# Patient Record
Sex: MALE | Race: WHITE | NOT HISPANIC OR LATINO | Employment: FULL TIME | URBAN - METROPOLITAN AREA
[De-identification: names, ages, dates, MRNs, and addresses within clinical notes are randomized per-mention and may not be internally consistent; named-entity substitution may affect disease eponyms.]

---

## 2022-04-27 ENCOUNTER — TELEPHONE (OUTPATIENT)
Dept: GASTROENTEROLOGY | Facility: CLINIC | Age: 27
End: 2022-04-27

## 2022-06-01 ENCOUNTER — OFFICE VISIT (OUTPATIENT)
Dept: GASTROENTEROLOGY | Facility: CLINIC | Age: 27
End: 2022-06-01
Payer: COMMERCIAL

## 2022-06-01 VITALS
HEIGHT: 72 IN | DIASTOLIC BLOOD PRESSURE: 64 MMHG | SYSTOLIC BLOOD PRESSURE: 118 MMHG | OXYGEN SATURATION: 99 % | WEIGHT: 179 LBS | HEART RATE: 86 BPM | BODY MASS INDEX: 24.24 KG/M2

## 2022-06-01 DIAGNOSIS — K21.9 GASTROESOPHAGEAL REFLUX DISEASE WITHOUT ESOPHAGITIS: Primary | ICD-10-CM

## 2022-06-01 PROCEDURE — 99244 OFF/OP CNSLTJ NEW/EST MOD 40: CPT | Performed by: INTERNAL MEDICINE

## 2022-06-01 RX ORDER — ALPRAZOLAM 0.5 MG/1
0.5 TABLET ORAL 2 TIMES DAILY PRN
COMMUNITY
Start: 2022-05-23

## 2022-06-01 RX ORDER — PANTOPRAZOLE SODIUM 40 MG/1
40 TABLET, DELAYED RELEASE ORAL
Qty: 62 TABLET | Refills: 6 | Status: SHIPPED | OUTPATIENT
Start: 2022-06-01 | End: 2022-06-24

## 2022-06-01 RX ORDER — PANTOPRAZOLE SODIUM 40 MG/1
40 TABLET, DELAYED RELEASE ORAL DAILY
COMMUNITY
Start: 2022-05-20

## 2022-06-01 NOTE — PATIENT INSTRUCTIONS
Scheduled date of EGD(as of today):6/13/22  Physician performing EGD:Claribel  Location of EGD:[place]  Instructions reviewed with patient by:Kristie ROWLAND  Clearances:  none

## 2022-06-01 NOTE — PROGRESS NOTES
[facility] - Outpatient Consultation  Denzel Adamson 27 y o  male MRN: [id number]  Encounter: [id number]          ASSESSMENT AND PLAN:      1  Gastroesophageal reflux disease without esophagitis  - pantoprazole (PROTONIX) 40 mg tablet; Take 1 tablet (40 mg total) by mouth 2 (two) times a day before meals  Dispense: 62 tablet; Refill: 6  - EGD; Future    We had a discussion regarding Barretts esophagus and a diagram was used to demonstrate how Bledsoe's is formed  Since he remains symptomatic despite pantoprazole once a day my plan was to increase the pantoprazole to twice daily for 1 month and also to plan to perform esophagogastroduodenoscopy  The patient is in full agreement     ______________________________________________________________________    HPI:  This 68-year-old male comes the office today with complaint of ongoing problems with gastroesophageal reflux disease  He states that his symptoms became severe about 1 year ago with daily multiple episodes of heartburn  He has been on pantoprazole for the past 1 month which she takes in the morning time before breakfast   He states that this has reduced his acid reflux symptoms significantly however he still experiencing a single breakthrough heartburn symptom daily  He denies eating late at night before going to bed  We discussed the some of the food triggers to include sodas which she has reduce  He also states that red tomato sauces frequent provoke her and he has tried to reduce that as well  He does admit to gaseousness but he denies diarrhea, constipation, abdominal pain, rectal bleeding, hematemesis, melena, nausea, vomiting  He states that his weight was a factor  He used to weigh 160 lb and then this increased to 200 lb and now that he is able to go to the gym and eat better, his weight has reduced back down to 179 lb  He denies dysphagia or odynophagia        REVIEW OF SYSTEMS:    CONSTITUTIONAL: Denies any fever, chills, rigors, and weight loss  HEENT: No earache or tinnitus  Denies hearing loss or visual disturbances  CARDIOVASCULAR: No chest pain or palpitations  RESPIRATORY: Denies any cough, hemoptysis, shortness of breath or dyspnea on exertion  GASTROINTESTINAL: As noted in the History of Present Illness  GENITOURINARY: No problems with urination  Denies any hematuria or dysuria  NEUROLOGIC: No dizziness or vertigo, denies headaches  MUSCULOSKELETAL: Denies any muscle or joint pain  SKIN: Denies skin rashes or itching  ENDOCRINE: Denies excessive thirst  Denies intolerance to heat or cold  PSYCHOSOCIAL: Denies depression or anxiety  Denies any recent memory loss  Historical Information   History reviewed  No pertinent past medical history  Past Surgical History:   Procedure Laterality Date    GUM SURGERY       Social History   Social History     Substance and Sexual Activity   Alcohol Use Yes    Comment: soc     Social History     Substance and Sexual Activity   Drug Use Never     Social History     Tobacco Use   Smoking Status Never Smoker   Smokeless Tobacco Never Used     History reviewed  No pertinent family history  Meds/Allergies       Current Outpatient Medications:     pantoprazole (PROTONIX) 40 mg tablet    ALPRAZolam (XANAX) 0 5 mg tablet    pantoprazole (PROTONIX) 40 mg tablet    Allergies   Allergen Reactions    Amoxicillin Other (See Comments)    Iodine - Food Allergy Other (See Comments)           Objective     Blood pressure 118/64, pulse 86, height 6' (1 829 m), weight 81 2 kg (179 lb), SpO2 99 %  Body mass index is 24 28 kg/m²  PHYSICAL EXAM:      General Appearance:   Alert, cooperative, no distress   HEENT:   Normocephalic, atraumatic, anicteric      Neck:  Supple, symmetrical, trachea midline   Lungs:   Clear to auscultation bilaterally; no rales, rhonchi or wheezing; respirations unlabored    Heart[de-identified]   Regular rate and rhythm; no murmur, rub, or gallop  Abdomen:   Soft, non-tender, non-distended; normal bowel sounds; no masses, no organomegaly    Genitalia:   Deferred    Rectal:   Deferred    Extremities:  No cyanosis, clubbing or edema    Pulses:  2+ and symmetric    Skin:  No jaundice, rashes, or lesions    Lymph nodes:  No palpable cervical lymphadenopathy        Lab Results:   No visits with results within 1 Day(s) from this visit  Latest known visit with results is:   No results found for any previous visit  Radiology Results:   No results found

## 2022-06-01 NOTE — LETTER
June 2, 2022     MD [place] [id number]    Patient: Rena Wolfe   YOB: 1995   Date of Visit: 6/1/2022       Dear Dr Kelly Gomez: Thank you for referring Rena Wolfe to me for evaluation  Below are my notes for this consultation  If you have questions, please do not hesitate to call me  I look forward to following your patient along with you  Sincerely,        Perry Carvajal DO        CC: No Recipients  Perry Carvajal DO  6/1/2022  8:22 AM  Signed  [facility] - Outpatient Consultation  Denzel Adamson 27 y o  male MRN: [id number]  Encounter: [id number]          ASSESSMENT AND PLAN:      1  Gastroesophageal reflux disease without esophagitis  - pantoprazole (PROTONIX) 40 mg tablet; Take 1 tablet (40 mg total) by mouth 2 (two) times a day before meals  Dispense: 62 tablet; Refill: 6  - EGD; Future    We had a discussion regarding Barretts esophagus and a diagram was used to demonstrate how Bledsoe's is formed  Since he remains symptomatic despite pantoprazole once a day my plan was to increase the pantoprazole to twice daily for 1 month and also to plan to perform esophagogastroduodenoscopy  The patient is in full agreement     ______________________________________________________________________    HPI:  This 68-year-old male comes the office today with complaint of ongoing problems with gastroesophageal reflux disease  He states that his symptoms became severe about 1 year ago with daily multiple episodes of heartburn  He has been on pantoprazole for the past 1 month which she takes in the morning time before breakfast   He states that this has reduced his acid reflux symptoms significantly however he still experiencing a single breakthrough heartburn symptom daily  He denies eating late at night before going to bed  We discussed the some of the food triggers to include sodas which she has reduce    He also states that red tomato sauces frequent provoke her and he has tried to reduce that as well  He does admit to gaseousness but he denies diarrhea, constipation, abdominal pain, rectal bleeding, hematemesis, melena, nausea, vomiting  He states that his weight was a factor  He used to weigh 160 lb and then this increased to 200 lb and now that he is able to go to the gym and eat better, his weight has reduced back down to 179 lb  He denies dysphagia or odynophagia  REVIEW OF SYSTEMS:    CONSTITUTIONAL: Denies any fever, chills, rigors, and weight loss  HEENT: No earache or tinnitus  Denies hearing loss or visual disturbances  CARDIOVASCULAR: No chest pain or palpitations  RESPIRATORY: Denies any cough, hemoptysis, shortness of breath or dyspnea on exertion  GASTROINTESTINAL: As noted in the History of Present Illness  GENITOURINARY: No problems with urination  Denies any hematuria or dysuria  NEUROLOGIC: No dizziness or vertigo, denies headaches  MUSCULOSKELETAL: Denies any muscle or joint pain  SKIN: Denies skin rashes or itching  ENDOCRINE: Denies excessive thirst  Denies intolerance to heat or cold  PSYCHOSOCIAL: Denies depression or anxiety  Denies any recent memory loss  Historical Information   History reviewed  No pertinent past medical history  Past Surgical History:   Procedure Laterality Date    GUM SURGERY       Social History   Social History     Substance and Sexual Activity   Alcohol Use Yes    Comment: soc     Social History     Substance and Sexual Activity   Drug Use Never     Social History     Tobacco Use   Smoking Status Never Smoker   Smokeless Tobacco Never Used     History reviewed  No pertinent family history      Meds/Allergies       Current Outpatient Medications:     pantoprazole (PROTONIX) 40 mg tablet    ALPRAZolam (XANAX) 0 5 mg tablet    pantoprazole (PROTONIX) 40 mg tablet    Allergies   Allergen Reactions    Amoxicillin Other (See Comments)    Iodine - Food Allergy Other (See Comments)           Objective     Blood pressure 118/64, pulse 86, height 6' (1 829 m), weight 81 2 kg (179 lb), SpO2 99 %  Body mass index is 24 28 kg/m²  PHYSICAL EXAM:      General Appearance:   Alert, cooperative, no distress   HEENT:   Normocephalic, atraumatic, anicteric      Neck:  Supple, symmetrical, trachea midline   Lungs:   Clear to auscultation bilaterally; no rales, rhonchi or wheezing; respirations unlabored    Heart[de-identified]   Regular rate and rhythm; no murmur, rub, or gallop  Abdomen:   Soft, non-tender, non-distended; normal bowel sounds; no masses, no organomegaly    Genitalia:   Deferred    Rectal:   Deferred    Extremities:  No cyanosis, clubbing or edema    Pulses:  2+ and symmetric    Skin:  No jaundice, rashes, or lesions    Lymph nodes:  No palpable cervical lymphadenopathy        Lab Results:   No visits with results within 1 Day(s) from this visit  Latest known visit with results is:   No results found for any previous visit  Radiology Results:   No results found

## 2022-06-09 ENCOUNTER — TELEPHONE (OUTPATIENT)
Dept: PULMONOLOGY | Facility: CLINIC | Age: 27
End: 2022-06-09

## 2022-06-09 NOTE — TELEPHONE ENCOUNTER
LM for Pt to call back to schedule consult  Reason for visit is due to reoccurring respiratory infections  Can be scheduled first available 
Negative

## 2022-06-13 ENCOUNTER — ANESTHESIA (OUTPATIENT)
Dept: GASTROENTEROLOGY | Facility: HOSPITAL | Age: 27
End: 2022-06-13

## 2022-06-13 ENCOUNTER — HOSPITAL ENCOUNTER (OUTPATIENT)
Dept: GASTROENTEROLOGY | Facility: HOSPITAL | Age: 27
Setting detail: OUTPATIENT SURGERY
Discharge: HOME | End: 2022-06-13
Attending: INTERNAL MEDICINE | Admitting: INTERNAL MEDICINE
Payer: COMMERCIAL

## 2022-06-13 ENCOUNTER — ANESTHESIA EVENT (OUTPATIENT)
Dept: GASTROENTEROLOGY | Facility: HOSPITAL | Age: 27
End: 2022-06-13

## 2022-06-13 VITALS
RESPIRATION RATE: 16 BRPM | HEIGHT: 72 IN | OXYGEN SATURATION: 100 % | DIASTOLIC BLOOD PRESSURE: 56 MMHG | BODY MASS INDEX: 24.16 KG/M2 | SYSTOLIC BLOOD PRESSURE: 114 MMHG | WEIGHT: 178.35 LBS | TEMPERATURE: 97.8 F | HEART RATE: 54 BPM

## 2022-06-13 DIAGNOSIS — K21.9 GASTROESOPHAGEAL REFLUX DISEASE WITHOUT ESOPHAGITIS: ICD-10-CM

## 2022-06-13 PROCEDURE — 43235 EGD DIAGNOSTIC BRUSH WASH: CPT | Performed by: INTERNAL MEDICINE

## 2022-06-13 RX ORDER — MELOXICAM 15 MG/1
15 TABLET ORAL DAILY
COMMUNITY

## 2022-06-13 RX ORDER — PROPOFOL 10 MG/ML
INJECTION, EMULSION INTRAVENOUS AS NEEDED
Status: DISCONTINUED | OUTPATIENT
Start: 2022-06-13 | End: 2022-06-13

## 2022-06-13 RX ORDER — SODIUM CHLORIDE, SODIUM LACTATE, POTASSIUM CHLORIDE, CALCIUM CHLORIDE 600; 310; 30; 20 MG/100ML; MG/100ML; MG/100ML; MG/100ML
125 INJECTION, SOLUTION INTRAVENOUS CONTINUOUS
Status: CANCELLED | OUTPATIENT
Start: 2022-06-13

## 2022-06-13 RX ORDER — SODIUM CHLORIDE, SODIUM LACTATE, POTASSIUM CHLORIDE, CALCIUM CHLORIDE 600; 310; 30; 20 MG/100ML; MG/100ML; MG/100ML; MG/100ML
125 INJECTION, SOLUTION INTRAVENOUS CONTINUOUS
Status: DISCONTINUED | OUTPATIENT
Start: 2022-06-13 | End: 2022-06-17 | Stop reason: HOSPADM

## 2022-06-13 RX ORDER — LIDOCAINE HYDROCHLORIDE 20 MG/ML
INJECTION, SOLUTION EPIDURAL; INFILTRATION; INTRACAUDAL; PERINEURAL AS NEEDED
Status: DISCONTINUED | OUTPATIENT
Start: 2022-06-13 | End: 2022-06-13

## 2022-06-13 RX ADMIN — PROPOFOL 50 MG: 10 INJECTION, EMULSION INTRAVENOUS at 10:18

## 2022-06-13 RX ADMIN — LIDOCAINE HYDROCHLORIDE 80 MG: 20 INJECTION, SOLUTION EPIDURAL; INFILTRATION; INTRACAUDAL at 10:17

## 2022-06-13 RX ADMIN — PROPOFOL 150 MG: 10 INJECTION, EMULSION INTRAVENOUS at 10:17

## 2022-06-13 RX ADMIN — SODIUM CHLORIDE, SODIUM LACTATE, POTASSIUM CHLORIDE, AND CALCIUM CHLORIDE 125 ML/HR: .6; .31; .03; .02 INJECTION, SOLUTION INTRAVENOUS at 09:18

## 2022-06-13 NOTE — H&P
History and Physical - [facility]  Denzel Adamson 27 y o  male MRN: [id number]      HPI: Homar Bennett is a 27y o  year old male who presents for evaluation of gastroesophageal reflux disease      REVIEW OF SYSTEMS: Per the HPI, and otherwise unremarkable  Historical Information   History reviewed  No pertinent past medical history  Past Surgical History:   Procedure Laterality Date    EGD      GUM SURGERY       Social History   Social History     Substance and Sexual Activity   Alcohol Use Yes    Comment: soc     Social History     Substance and Sexual Activity   Drug Use Never     Social History     Tobacco Use   Smoking Status Never Smoker   Smokeless Tobacco Never Used     History reviewed  No pertinent family history  Meds/Allergies     (Not in a hospital admission)      Allergies   Allergen Reactions    Amoxicillin Other (See Comments)    Iodine - Food Allergy Other (See Comments)       Objective     Blood pressure 120/68, pulse 61, temperature 98 1 °F (36 7 °C), temperature source Temporal, resp  rate 16, height 6' (1 829 m), weight 80 9 kg (178 lb 5 6 oz), SpO2 100 %  PHYSICAL EXAM    Gen: NAD  CV: RRR  CHEST: Clear  ABD: soft, NT/ND  EXT: no edema      ASSESSMENT/PLAN:  This is a 27y o  year old male here for EGD, and he is stable and optimized for his procedure

## 2022-06-13 NOTE — ANESTHESIA POSTPROCEDURE EVALUATION
Post-Op Assessment Note    CV Status:  Stable    Pain management: adequate     Mental Status:  Alert and awake   Hydration Status:  Euvolemic   PONV Controlled:  Controlled   Airway Patency:  Patent      Post Op Vitals Reviewed: Yes      Staff: Anesthesiologist, CRNA         No complications documented      /63 (06/13/22 1025)    Temp 97 8 °F (36 6 °C) (06/13/22 1025)    Pulse 60 (06/13/22 1025)   Resp 16 (06/13/22 1025)    SpO2 98 % (06/13/22 1025)

## 2022-06-13 NOTE — ANESTHESIA PREPROCEDURE EVALUATION
Procedure:  EGD         Physical Exam    Airway    Mallampati score: III  TM Distance: >3 FB  Neck ROM: full     Dental       Cardiovascular  Cardiovascular exam normal    Pulmonary  Pulmonary exam normal     Other Findings      ASSESSMENT AND PLAN:       1  Gastroesophageal reflux disease without esophagitis  - pantoprazole (PROTONIX) 40 mg tablet; Take 1 tablet (40 mg total) by mouth 2 (two) times a day before meals  Dispense: 62 tablet; Refill: 6  - EGD; Future     We had a discussion regarding Barretts esophagus and a diagram was used to demonstrate how Bledsoe's is formed  Since he remains symptomatic despite pantoprazole once a day my plan was to increase the pantoprazole to twice daily for 1 month and also to plan to perform esophagogastroduodenoscopy  The patient is in full agreement      ______________________________________________________________________     HPI:  This 57-year-old male comes the office today with complaint of ongoing problems with gastroesophageal reflux disease  He states that his symptoms became severe about 1 year ago with daily multiple episodes of heartburn  He has been on pantoprazole for the past 1 month which she takes in the morning time before breakfast   He states that this has reduced his acid reflux symptoms significantly however he still experiencing a single breakthrough heartburn symptom daily  He denies eating late at night before going to bed  We discussed the some of the food triggers to include sodas which she has reduce  He also states that red tomato sauces frequent provoke her and he has tried to reduce that as well  He does admit to gaseousness but he denies diarrhea, constipation, abdominal pain, rectal bleeding, hematemesis, melena, nausea, vomiting  He states that his weight was a factor  He used to weigh 160 lb and then this increased to 200 lb and now that he is able to go to the gym and eat better, his weight has reduced back down to 179 lb    He denies dysphagia or odynophagia      Anesthesia Plan  ASA Score- 2     Anesthesia Type- IV sedation with anesthesia with ASA Monitors  Additional Monitors:   Airway Plan:           Plan Factors-Exercise tolerance (METS): >4 METS  Chart reviewed  EKG reviewed  Existing labs reviewed  Patient summary reviewed  Patient is not a current smoker  Induction- intravenous  Postoperative Plan-     Informed Consent- Anesthetic plan and risks discussed with patient  I personally reviewed this patient with the CRNA  Discussed and agreed on the Anesthesia Plan with the CRNA  Craig Lopez

## 2022-06-14 ENCOUNTER — TELEPHONE (OUTPATIENT)
Dept: GASTROENTEROLOGY | Facility: CLINIC | Age: 27
End: 2022-06-14

## 2022-06-14 ENCOUNTER — PREP FOR PROCEDURE (OUTPATIENT)
Dept: GASTROENTEROLOGY | Facility: CLINIC | Age: 27
End: 2022-06-14

## 2022-06-14 DIAGNOSIS — K21.9 GASTROESOPHAGEAL REFLUX DISEASE WITHOUT ESOPHAGITIS: Primary | ICD-10-CM

## 2022-06-14 NOTE — TELEPHONE ENCOUNTER
----- Message from Yeison Ponce DO sent at 6/13/2022 10:28 AM EDT -----  Regarding: schedule egd with bravo placement  Please schedule this patient for an EGD with Mazariegos placement a make shift the patient is not on a proton pump inhibitor for at least 48 hours prior to the procedure

## 2022-06-14 NOTE — TELEPHONE ENCOUNTER
Spoke with the patient and he scheduled his procedure  Prep instructions mailed to his home indicating to stop his Pantoprazole 48 hours prior to the procedure      Procedure: EGD with Bravo placement  Scheduled date of procedure (as of today):8/15/22  Physician performing procedure:Claribel  Location of procedure:[place]  Instructions reviewed with patient by: Lily Noland  Clearances: none

## 2022-06-24 DIAGNOSIS — K21.9 GASTROESOPHAGEAL REFLUX DISEASE WITHOUT ESOPHAGITIS: ICD-10-CM

## 2022-06-24 RX ORDER — PANTOPRAZOLE SODIUM 40 MG/1
TABLET, DELAYED RELEASE ORAL
Qty: 186 TABLET | Refills: 3 | Status: SHIPPED | OUTPATIENT
Start: 2022-06-24

## 2022-06-27 ENCOUNTER — TELEPHONE (OUTPATIENT)
Dept: GASTROENTEROLOGY | Facility: CLINIC | Age: 27
End: 2022-06-27

## 2022-06-27 NOTE — TELEPHONE ENCOUNTER
Patients GI provider:  Dr Malathi Mejia    Number to return call: [phone]    Reason for call: Pt called in stating that since he has been taking pantoprazole that he is having BMs more frequently, diarrhea and bright red blood when wiping  Pt would like to know if he should have his procedure pushed up or stop taking the pantoprazole  Above is his number       Scheduled procedure/appointment date if applicable: procedure 0/48/40

## 2022-06-27 NOTE — TELEPHONE ENCOUNTER
ALEXY: Spoke with patient  History of GERD    Patient c/o soft frequent BM after starting pantoprazole 40mg BID  He also has BRBPR when wiping since BM have been more frequent  Patient would like to decrease his PPI to once a day and follow-up with EGD/HESS   HE will let us know if symptoms persist

## 2022-07-07 ENCOUNTER — CONSULT (OUTPATIENT)
Dept: PULMONOLOGY | Facility: CLINIC | Age: 27
End: 2022-07-07
Payer: COMMERCIAL

## 2022-07-07 VITALS
RESPIRATION RATE: 18 BRPM | HEART RATE: 66 BPM | SYSTOLIC BLOOD PRESSURE: 120 MMHG | BODY MASS INDEX: 23.57 KG/M2 | OXYGEN SATURATION: 99 % | DIASTOLIC BLOOD PRESSURE: 82 MMHG | TEMPERATURE: 97.6 F | WEIGHT: 174 LBS | HEIGHT: 72 IN

## 2022-07-07 DIAGNOSIS — R06.02 SOB (SHORTNESS OF BREATH): Primary | ICD-10-CM

## 2022-07-07 DIAGNOSIS — R05.3 CHRONIC COUGH: ICD-10-CM

## 2022-07-07 DIAGNOSIS — R09.82 POSTNASAL DRIP: ICD-10-CM

## 2022-07-07 DIAGNOSIS — K21.9 GASTROESOPHAGEAL REFLUX DISEASE WITHOUT ESOPHAGITIS: ICD-10-CM

## 2022-07-07 DIAGNOSIS — R07.89 OTHER CHEST PAIN: ICD-10-CM

## 2022-07-07 DIAGNOSIS — R00.2 PALPITATION: ICD-10-CM

## 2022-07-07 PROCEDURE — 99244 OFF/OP CNSLTJ NEW/EST MOD 40: CPT | Performed by: INTERNAL MEDICINE

## 2022-07-07 NOTE — ASSESSMENT & PLAN NOTE
Given multiple complaints involving multiple system an otherwise very healthy young man I believe these could be manifestation of anxiety/panic disorder and explained that to the patient and his mother, he once reassurance that there is no organic problem going on and he is already following with psychotherapist who told him that he does not have anxiety disorder  I will send patient for PFTs with methacholine challenge test although even if he has asthma which may explain the shortness of breath and the chronic cough but does not explain the other symptoms  If he continues to have breathing issues in the future any to cardiopulmonary exercise test and chest CT scan

## 2022-07-07 NOTE — ASSESSMENT & PLAN NOTE
Unclear etiology, most likely not cardiac ischemia and not related to pulmonary disease  One thing to consider is mitral valve prolapse and to consider echocardiogram in the future    Otherwise it could be related to anxiety as described above  I will check some labs to rule out inflammatory status such as CRP, sed rate and also PIEDAD

## 2022-07-07 NOTE — ASSESSMENT & PLAN NOTE
Most likely secondary to chronic sinus congestion and postnasal drip with possibility of GERD or cough variant asthma    Will follow on PFTs with methacholine challenge test

## 2022-07-07 NOTE — PROGRESS NOTES
Consultation - Pulmonary Medicine   Denzel Adamson 27 y o  male MRN: [id number]    Physician Requesting Consult: Rachel Thomas MD  Reason for Consult:  Shortness of breath and chest pain    SOB (shortness of breath)  Given multiple complaints involving multiple system an otherwise very healthy young man I believe these could be manifestation of anxiety/panic disorder and explained that to the patient and his mother, he once reassurance that there is no organic problem going on and he is already following with psychotherapist who told him that he does not have anxiety disorder  I will send patient for PFTs with methacholine challenge test although even if he has asthma which may explain the shortness of breath and the chronic cough but does not explain the other symptoms  If he continues to have breathing issues in the future any to cardiopulmonary exercise test and chest CT scan  Other chest pain  Unclear etiology, most likely not cardiac ischemia and not related to pulmonary disease  One thing to consider is mitral valve prolapse and to consider echocardiogram in the future  Otherwise it could be related to anxiety as described above  I will check some labs to rule out inflammatory status such as CRP, sed rate and also PIEDAD    Palpitation  Also could be related to anxiety but will check TSH and other labs    Chronic cough  Most likely secondary to chronic sinus congestion and postnasal drip with possibility of GERD or cough variant asthma  Will follow on PFTs with methacholine challenge test      Postnasal drip  I offered patient Flonase but he could not tolerate in the past then I offered ipratropium or azelastine but he prefers not to treat for now  GERD (gastroesophageal reflux disease)  He stop Protonix due to diarrhea  I offered him Pepcid but he wants to discuss with his PCP  I explained to him that it is over-the-counter as well        Diagnoses and all orders for this visit:    SOB (shortness of breath)  -     Complete PFT with post bronchodilator; Future  -     Methacholine challenge; Future  -     TSH, 3rd generation with Free T4 reflex; Future  -     C-reactive protein; Future  -     Sedimentation rate, automated; Future  -     PIEDAD Screen w/ Reflex to Titer/Pattern; Future    Postnasal drip    Palpitation  -     TSH, 3rd generation with Free T4 reflex; Future  -     C-reactive protein; Future    Other chest pain  -     C-reactive protein; Future  -     Sedimentation rate, automated; Future  -     PIEDAD Screen w/ Reflex to Titer/Pattern; Future    Chronic cough  -     Complete PFT with post bronchodilator; Future  -     Methacholine challenge; Future    Gastroesophageal reflux disease without esophagitis      ______________________________________________________________________    HPI:    Marilyn Zhang is a 27 y o  male who presents for evaluation of chest pain and shortness of breath  Patient has multiple vague complaints that started about 2 months ago  Initially started with heartburn/acid reflux then followed by bilateral chest pain and episodes of shortness of breath and hyperventilation  He went to the emergency room twice and had workup including chest x-ray and EKGs and D-dimer and everything was negative, at some point he was told that he has costochondritis and treated with NSAID without improvement  Currently he follows with chiropractor  He continues to have episodes of chest pain sometimes at rest or without any exertion, in the same for the shortness of breath that can happen at rest   He reports that he wakes up in the morning feeling fine then during the day he feels tired and gets symptoms of pain and shortness of breath  He was treated with pantoprazole and the dose was increased to twice daily that caused him diarrhea so he stopped  Currently after stopping the pantoprazole started to have the GERD again but he is managing without it    He had EGD recently that was normal   He had also other symptoms including nausea and vomiting recently, also some lightheadedness and dizziness sensation that resolved, he had also tingling/numbness in his arms/hands  He also reports some palpitations but that improved  Patient follows with psychotherapist who prescribed him Xanax to use p r n  when he gets anxious due to that chest pain  According to the patient his therapist told him that he does not have panic disorder  Patient has chronic sinus congestion with postnasal drip and he has chronic dry cough that goes for years  He tried Flonase in the past that caused him nasal bleed and so sometimes he takes Allegra  He denies legs edema, denies skin rashes, denies history of asthma  When he was a child he has some skin rash that was diffuse and probably atopic dermatitis that improved and at that time he was given inhaler but did not use because he did not have respiratory symptoms as per his mom  He denies arthralgias  He used to go to the gym routinely but currently start because of the above symptoms  He reports weight loss about 20-25 lb over the past few months, not intentional   Patient denies any increased stresses in his life and no change in his environment in general   He lives with his girlfriend, he is planning to propose, he has 2 cats at home and denies allergy  Never smoked cigarettes and denies vaping or other drugs  He works in  and denies occupational exposure  Review of Systems:  Review of Systems   Constitutional: Positive for unexpected weight change  HENT: Positive for congestion and postnasal drip  Eyes: Negative  Respiratory: Positive for cough and shortness of breath  Cardiovascular: Positive for chest pain and palpitations  Gastrointestinal: Positive for nausea and vomiting  Endocrine: Negative  Genitourinary: Negative  Musculoskeletal: Negative  Skin: Negative  Allergic/Immunologic: Negative  Neurological: Positive for numbness  Hematological: Negative  Psychiatric/Behavioral: Negative  Aside from what is mentioned in the HPI, the review of systems otherwise negative  Current Medications:    Current Outpatient Medications:     ALPRAZolam (XANAX) 0 5 mg tablet, Take 0 5 mg by mouth 2 (two) times a day as needed, Disp: , Rfl:     meloxicam (MOBIC) 15 mg tablet, Take 15 mg by mouth daily, Disp: , Rfl:     pantoprazole (PROTONIX) 40 mg tablet, Take 40 mg by mouth daily, Disp: , Rfl:     pantoprazole (PROTONIX) 40 mg tablet, TAKE 1 TABLET BY MOUTH 2 TIMES A DAY BEFORE MEALS , Disp: 186 tablet, Rfl: 3    Historical Information   History reviewed  No pertinent past medical history  Past Surgical History:   Procedure Laterality Date    EGD      GUM SURGERY       Social History   Social History     Tobacco Use   Smoking Status Never Smoker   Smokeless Tobacco Never Used       Occupational history:  No occupational exposure    Family History:   History reviewed  No pertinent family history  No family history of asthma      PhysicalExamination:  Vitals:   /82 (BP Location: Left arm, Patient Position: Sitting, Cuff Size: Standard)   Pulse 66   Temp 97 6 °F (36 4 °C) (Tympanic)   Resp 18   Ht 6' (1 829 m)   Wt 78 9 kg (174 lb)   SpO2 99%   BMI 23 60 kg/m²     General: alert, not in acute distress  HEENT: PERRL, no icteric sclera or cyanosis, no thrush  Neck:  Supple, no lymphadenopathy, possible minimal thyromegaly, no JVD  Lungs:  Equal breath sounds and clear auscultations bilaterally, no wheezing or crackles  No reproducible pain by palpation of parasternal area  Heart: S1S2 regular, no murmures or gallops  Abdomen: soft, non-tender, bowel sounds  present  Extrimities: no edema, no clubbing or cyanosis  Neuro: Alert and oriented x 3, no focal neurodeficits   Skin: intact, no rashes      Diagnostic Data:  Labs:   I personally reviewed the most recent laboratory data pertinent to today's visit    LABS FROM [facility] 2022: HEMOGLOBIN 15 6, CREATININE 1 06, LFTS NORMAL    D-DIMER NEGATIVE X2    Imaging:  I personally reviewed the images on the [facility] system pertinent to today's visit  Chest x-rays reviewed on PACs: Clear lungs    Other studies:  Egd:  IMPRESSION:  1  Normal EGD  2   Gastroesophageal reflux disease without esophagitis       Bebo Jackson MD

## 2022-07-07 NOTE — ASSESSMENT & PLAN NOTE
I offered patient Flonase but he could not tolerate in the past then I offered ipratropium or azelastine but he prefers not to treat for now

## 2022-07-07 NOTE — ASSESSMENT & PLAN NOTE
He stop Protonix due to diarrhea  I offered him Pepcid but he wants to discuss with his PCP  I explained to him that it is over-the-counter as well

## 2022-07-08 ENCOUNTER — APPOINTMENT (OUTPATIENT)
Dept: LAB | Facility: CLINIC | Age: 27
End: 2022-07-08
Payer: COMMERCIAL

## 2022-07-08 DIAGNOSIS — R06.02 SOB (SHORTNESS OF BREATH): ICD-10-CM

## 2022-07-08 DIAGNOSIS — R07.89 OTHER CHEST PAIN: ICD-10-CM

## 2022-07-08 DIAGNOSIS — R00.2 PALPITATION: ICD-10-CM

## 2022-07-08 LAB
CRP SERPL QL: <3 MG/L
ERYTHROCYTE [SEDIMENTATION RATE] IN BLOOD: 4 MM/HOUR (ref 0–14)
TSH SERPL DL<=0.05 MIU/L-ACNC: 0.68 UIU/ML (ref 0.45–4.5)

## 2022-07-08 PROCEDURE — 84443 ASSAY THYROID STIM HORMONE: CPT

## 2022-07-08 PROCEDURE — 85652 RBC SED RATE AUTOMATED: CPT

## 2022-07-08 PROCEDURE — 36415 COLL VENOUS BLD VENIPUNCTURE: CPT

## 2022-07-08 PROCEDURE — 86140 C-REACTIVE PROTEIN: CPT

## 2022-07-08 PROCEDURE — 86038 ANTINUCLEAR ANTIBODIES: CPT

## 2022-07-11 LAB — RYE IGE QN: NEGATIVE

## 2022-07-13 ENCOUNTER — TELEPHONE (OUTPATIENT)
Dept: GASTROENTEROLOGY | Facility: CLINIC | Age: 27
End: 2022-07-13

## 2022-07-13 ENCOUNTER — APPOINTMENT (OUTPATIENT)
Dept: LAB | Facility: CLINIC | Age: 27
End: 2022-07-13
Payer: COMMERCIAL

## 2022-07-13 DIAGNOSIS — R53.83 FATIGUE, UNSPECIFIED TYPE: ICD-10-CM

## 2022-07-13 DIAGNOSIS — D51.9 ANEMIA DUE TO VITAMIN B12 DEFICIENCY, UNSPECIFIED B12 DEFICIENCY TYPE: ICD-10-CM

## 2022-07-13 DIAGNOSIS — E55.9 VITAMIN D DEFICIENCY: ICD-10-CM

## 2022-07-13 LAB
25(OH)D3 SERPL-MCNC: 34.6 NG/ML (ref 30–100)
FOLATE SERPL-MCNC: 12 NG/ML (ref 3.1–17.5)
VIT B12 SERPL-MCNC: 729 PG/ML (ref 100–900)

## 2022-07-13 PROCEDURE — 84403 ASSAY OF TOTAL TESTOSTERONE: CPT

## 2022-07-13 PROCEDURE — 82746 ASSAY OF FOLIC ACID SERUM: CPT

## 2022-07-13 PROCEDURE — 84402 ASSAY OF FREE TESTOSTERONE: CPT

## 2022-07-13 PROCEDURE — 86618 LYME DISEASE ANTIBODY: CPT

## 2022-07-13 PROCEDURE — 36415 COLL VENOUS BLD VENIPUNCTURE: CPT

## 2022-07-13 PROCEDURE — 82306 VITAMIN D 25 HYDROXY: CPT

## 2022-07-13 PROCEDURE — 82607 VITAMIN B-12: CPT

## 2022-07-13 NOTE — TELEPHONE ENCOUNTER
Called and spoke with patient in regards to his procedure he had I June and if biopsies were taken during that procedure  Juan Alberto Cooper He wants to cancel his EGD bravo on 8/15/22, which rafa has already cancelled  PT states he rather have a f/u with Dr Daniela Woodson to discuss what other options he has   I scheduled him for 8/31/22 @ 8:00Am

## 2022-07-13 NOTE — TELEPHONE ENCOUNTER
Regarding: Question regarding EGD  I understand  However, I also had an EGD performed on 06/13/2022  I am inquiring about the biopsy/H  pylori status of the EGD from 6/13

## 2022-07-14 LAB
B BURGDOR IGG+IGM SER-ACNC: 0.5 AI
TESTOST FREE SERPL-MCNC: 20.3 PG/ML (ref 9.3–26.5)
TESTOST SERPL-MCNC: 434 NG/DL (ref 264–916)

## 2022-08-08 ENCOUNTER — HOSPITAL ENCOUNTER (OUTPATIENT)
Dept: PULMONOLOGY | Facility: HOSPITAL | Age: 27
Discharge: HOME | End: 2022-08-08
Payer: COMMERCIAL

## 2022-08-08 ENCOUNTER — TELEPHONE (OUTPATIENT)
Dept: CARDIOLOGY CLINIC | Facility: CLINIC | Age: 27
End: 2022-08-08

## 2022-08-08 DIAGNOSIS — R06.02 SOB (SHORTNESS OF BREATH): ICD-10-CM

## 2022-08-08 DIAGNOSIS — R05.3 CHRONIC COUGH: ICD-10-CM

## 2022-08-08 PROCEDURE — 94729 DIFFUSING CAPACITY: CPT

## 2022-08-08 PROCEDURE — 94760 N-INVAS EAR/PLS OXIMETRY 1: CPT

## 2022-08-08 PROCEDURE — 94060 EVALUATION OF WHEEZING: CPT

## 2022-08-08 PROCEDURE — 94727 GAS DIL/WSHOT DETER LNG VOL: CPT | Performed by: INTERNAL MEDICINE

## 2022-08-08 PROCEDURE — 94060 EVALUATION OF WHEEZING: CPT | Performed by: INTERNAL MEDICINE

## 2022-08-08 PROCEDURE — 94729 DIFFUSING CAPACITY: CPT | Performed by: INTERNAL MEDICINE

## 2022-08-08 PROCEDURE — 94727 GAS DIL/WSHOT DETER LNG VOL: CPT

## 2022-08-08 RX ORDER — ALBUTEROL SULFATE 2.5 MG/3ML
2.5 SOLUTION RESPIRATORY (INHALATION) ONCE
Status: COMPLETED | OUTPATIENT
Start: 2022-08-08 | End: 2022-08-08

## 2022-08-08 RX ADMIN — ALBUTEROL SULFATE 2.5 MG: 2.5 SOLUTION RESPIRATORY (INHALATION) at 07:44

## 2022-08-08 NOTE — TELEPHONE ENCOUNTER
SPOKE TO PT'S INSUR CO & WE ARE IN PAR W/ PT'S INSUR & NO REFERRAL IS NEEDED  REF # OF THE CALL IS: [id number]

## 2022-08-09 ENCOUNTER — OFFICE VISIT (OUTPATIENT)
Dept: CARDIOLOGY CLINIC | Facility: CLINIC | Age: 27
End: 2022-08-09
Payer: COMMERCIAL

## 2022-08-09 VITALS
OXYGEN SATURATION: 99 % | RESPIRATION RATE: 16 BRPM | WEIGHT: 171 LBS | HEIGHT: 72 IN | SYSTOLIC BLOOD PRESSURE: 114 MMHG | DIASTOLIC BLOOD PRESSURE: 66 MMHG | HEART RATE: 66 BPM | BODY MASS INDEX: 23.16 KG/M2

## 2022-08-09 DIAGNOSIS — R07.89 OTHER CHEST PAIN: ICD-10-CM

## 2022-08-09 DIAGNOSIS — R00.2 PALPITATIONS: Primary | ICD-10-CM

## 2022-08-09 PROCEDURE — 99204 OFFICE O/P NEW MOD 45 MIN: CPT | Performed by: INTERNAL MEDICINE

## 2022-08-09 PROCEDURE — 93000 ELECTROCARDIOGRAM COMPLETE: CPT | Performed by: INTERNAL MEDICINE
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
## 2022-08-31 ENCOUNTER — OFFICE VISIT (OUTPATIENT)
Dept: GASTROENTEROLOGY | Facility: CLINIC | Age: 27
End: 2022-08-31
Payer: COMMERCIAL

## 2022-08-31 ENCOUNTER — TREATMENT (OUTPATIENT)
Dept: GASTROENTEROLOGY | Facility: CLINIC | Age: 27
End: 2022-08-31

## 2022-08-31 VITALS
BODY MASS INDEX: 23.03 KG/M2 | WEIGHT: 170 LBS | SYSTOLIC BLOOD PRESSURE: 104 MMHG | OXYGEN SATURATION: 98 % | HEIGHT: 72 IN | HEART RATE: 75 BPM | DIASTOLIC BLOOD PRESSURE: 76 MMHG

## 2022-08-31 DIAGNOSIS — K21.9 GASTROESOPHAGEAL REFLUX DISEASE WITHOUT ESOPHAGITIS: Primary | ICD-10-CM

## 2022-08-31 DIAGNOSIS — K21.9 GASTROESOPHAGEAL REFLUX DISEASE WITHOUT ESOPHAGITIS: ICD-10-CM

## 2022-08-31 DIAGNOSIS — R10.13 EPIGASTRIC PAIN: ICD-10-CM

## 2022-08-31 PROCEDURE — 99214 OFFICE O/P EST MOD 30 MIN: CPT | Performed by: INTERNAL MEDICINE

## 2022-08-31 RX ORDER — PANTOPRAZOLE SODIUM 40 MG/1
40 TABLET, DELAYED RELEASE ORAL DAILY
Qty: 31 TABLET | Refills: 6 | Status: SHIPPED | OUTPATIENT
Start: 2022-08-31

## 2022-08-31 NOTE — PROGRESS NOTES
[facility] - Outpatient Follow-up Note  Denzel Adamson 27 y o  male MRN: [id number]  Encounter: [id number]          ASSESSMENT AND PLAN:      1  Gastroesophageal reflux disease without esophagitis  - esomeprazole (NexIUM) 20 mg capsule; Take 2 capsules (40 mg total) by mouth daily in the early morning  Dispense: 31 capsule; Refill: 6    No additional procedures warranted at this time    We discussed maximizing medical therapy versus the possibility of some procedure such as surgery which would prevent acid reflux from occurring  We have also discussed Bravo testing to determine the significance of his acid reflux     ______________________________________________________________________    SUBJECTIVE:  Denzel the returns the office today stating that he is having ongoing problems with acid reflux  He states that his symptoms have improved since he has lost weight and changes diet, avoiding the things that seem to worsen acid reflux  He states that alcohol tends to hurt him a lot so he minimizes the amount of alcohol that he drinking  His last esophagogastroduodenoscopy in June 13, 2022 was completely normal showing no evidence of Bledsoe's esophagus or damage to the EG junction  He does complain of gaseousness  He feels backed up  He was taking pantoprazole daily but he stop taking this altogether 2 months ago and so therefore he has not been taking anything  He has had some episodes of nausea vomiting but these are rare  He had had some episodes of diarrhea  He has breakthrough symptoms on occasion  REVIEW OF SYSTEMS IS OTHERWISE NEGATIVE  Historical Information   History reviewed  No pertinent past medical history    Past Surgical History:   Procedure Laterality Date    EGD      GUM SURGERY       Social History   Social History     Substance and Sexual Activity   Alcohol Use Not Currently    Comment: soc     Social History     Substance and Sexual Activity   Drug Use Never     Social History     Tobacco Use   Smoking Status Never Smoker   Smokeless Tobacco Never Used     History reviewed  No pertinent family history  Meds/Allergies       Current Outpatient Medications:     ALPRAZolam (XANAX) 0 5 mg tablet    esomeprazole (NexIUM) 20 mg capsule    Allergies   Allergen Reactions    Amoxicillin Other (See Comments)    Iodine - Food Allergy Other (See Comments)           Objective     Blood pressure 104/76, pulse 75, height 6' (1 829 m), weight 77 1 kg (170 lb), SpO2 98 %  Body mass index is 23 06 kg/m²  PHYSICAL EXAM:      General Appearance:   Alert, cooperative, no distress   HEENT:   Normocephalic, atraumatic, anicteric      Neck:  Supple, symmetrical, trachea midline   Lungs:   Clear to auscultation bilaterally; no rales, rhonchi or wheezing; respirations unlabored    Heart[de-identified]   Regular rate and rhythm; no murmur, rub, or gallop  Abdomen:   Soft, non-tender, non-distended; normal bowel sounds; no masses, no organomegaly    Genitalia:   Deferred    Rectal:   Deferred    Extremities:  No cyanosis, clubbing or edema    Pulses:  2+ and symmetric    Skin:  No jaundice, rashes, or lesions    Lymph nodes:  No palpable cervical lymphadenopathy        Lab Results:   No visits with results within 1 Day(s) from this visit  Latest known visit with results is:   Appointment on 2022   Component Date Value    Vitamin B-12 2022 729     Folate 2022 12 0     Lyme Total Antibodies 2022 0 5     Vit D, 25-Hydroxy 2022 34 6     Testosterone, Free 2022 20 3     TESTOSTERONE TOTAL 2022 434          Radiology Results:   Complete PFT with post bronchodilator    Result Date: 2022  Narrative: Pulmonary Function Test Report Denzel Adamson 27 y o  male MRN: [id number] Date of Testin2022 Requesting Physician:  Dr peterson Reason for Testing:  Shortness of breath, chronic cough Reference set for interpretation: NHANES III Procedure:  The patient was taken to pulmonary function testing laboratory  The patient demonstrated good effort and cooperation  The results of this test meet ATS standards for acceptability and repeatability  Data set appears appropriate for interpretation  Post bronchodilator testing performed after the administration of 2 5mg albuterol in 3cc normal saline administered via nebulizer per bronchodilator protocol  Results: FEV1/FVC Ratio:  79 Forced Vital Capacity:  7 14 L, 121 % predicted FEV1:  5  67 L, 117 % predicted After administration of bronchodilator FEV1:  6 15 L, 127% predicted with no appreciable response to the bronchodilator Lung volumes by  nitrogen wash out : Total Lung Capacity  111 % predicted Residual volume  67 % predicted DLCO corrected for patients hemoglobin level:  97 % Interpretation: Patient had a full lung function testing with spirometry lung volumes and DLCO  Patient gave a good effort  Results meet the ats standards for acceptability and repeatability   The flow volume curve is normal  The spirometry is normal  The lung volumes are normal  The DLCO is normal  Raysa Curran MD [facility]     Answers for HPI/ROS submitted by the patient on 8/24/2022  Chronicity: chronic  Onset: more than 1 year ago  Onset quality: gradual  Frequency: daily  Episode duration: 1 days  Progression since onset: waxing and waning  Pain location: epigastric region  Pain - numeric: 5/10  Pain quality: aching, burning, dull, a sensation of fullness, sharp  Radiates to: epigastric region, left shoulder, right shoulder, chest, back  anorexia: Yes  arthralgias: Yes  belching: Yes  constipation: No  diarrhea: No  dysuria: No  fever: No  flatus: Yes  frequency: No  headaches: Yes  hematochezia: No  hematuria: No  melena: No  myalgias: Yes  nausea: Yes  weight loss: Yes  vomiting: No  Aggravated by: certain positions, coughing, deep breathing, drinking alcohol, eating, movement  Relieved by: belching, passing flatus, recumbency  Diagnostic workup: GI consult, upper endoscopy

## 2022-08-31 NOTE — TELEPHONE ENCOUNTER
[facility] pharmacy calling in stating that the patient's insurance does not cover current order for 2 capsules of esomeprazole (Nexium) 20mg   They are asking if the order could please be changed to 1-40mg capsule daily as the insurance does cover 1 capsule daily

## 2022-09-02 DIAGNOSIS — K21.9 GASTROESOPHAGEAL REFLUX DISEASE WITHOUT ESOPHAGITIS: ICD-10-CM

## 2022-09-06 ENCOUNTER — APPOINTMENT (OUTPATIENT)
Dept: LAB | Facility: CLINIC | Age: 27
End: 2022-09-06
Payer: COMMERCIAL

## 2022-09-06 DIAGNOSIS — K21.9 GASTROESOPHAGEAL REFLUX DISEASE WITHOUT ESOPHAGITIS: ICD-10-CM

## 2022-09-06 DIAGNOSIS — E78.41 ELEVATED LIPOPROTEIN(A): ICD-10-CM

## 2022-09-06 DIAGNOSIS — K21.00 GASTROESOPHAGEAL REFLUX DISEASE WITH ESOPHAGITIS WITHOUT HEMORRHAGE: ICD-10-CM

## 2022-09-06 DIAGNOSIS — R53.82 CHRONIC FATIGUE: ICD-10-CM

## 2022-09-06 DIAGNOSIS — Z13.29 SCREENING FOR THYROID DISORDER: ICD-10-CM

## 2022-09-06 DIAGNOSIS — Z83.49 FAMILY HISTORY OF ENDOCRINE AND METABOLIC DISEASE: ICD-10-CM

## 2022-09-06 DIAGNOSIS — R10.13 EPIGASTRIC PAIN: ICD-10-CM

## 2022-09-06 DIAGNOSIS — M79.10 MYALGIA: ICD-10-CM

## 2022-09-06 LAB
ALBUMIN SERPL BCP-MCNC: 4.3 G/DL (ref 3.5–5)
ALP SERPL-CCNC: 57 U/L (ref 46–116)
ALT SERPL W P-5'-P-CCNC: 25 U/L (ref 12–78)
ANION GAP SERPL CALCULATED.3IONS-SCNC: 5 MMOL/L (ref 4–13)
AST SERPL W P-5'-P-CCNC: 11 U/L (ref 5–45)
BASOPHILS # BLD AUTO: 0.07 THOUSANDS/ΜL (ref 0–0.1)
BASOPHILS NFR BLD AUTO: 1 % (ref 0–1)
BILIRUB SERPL-MCNC: 0.53 MG/DL (ref 0.2–1)
BUN SERPL-MCNC: 18 MG/DL (ref 5–25)
C3 SERPL-MCNC: 109 MG/DL (ref 90–180)
C4 SERPL-MCNC: 26 MG/DL (ref 10–40)
CALCIUM SERPL-MCNC: 9.6 MG/DL (ref 8.3–10.1)
CHLORIDE SERPL-SCNC: 105 MMOL/L (ref 96–108)
CHOLEST SERPL-MCNC: 151 MG/DL
CO2 SERPL-SCNC: 27 MMOL/L (ref 21–32)
CORTIS SERPL-MCNC: 19.5 UG/DL
CREAT SERPL-MCNC: 1.22 MG/DL (ref 0.6–1.3)
EOSINOPHIL # BLD AUTO: 0.18 THOUSAND/ΜL (ref 0–0.61)
EOSINOPHIL NFR BLD AUTO: 3 % (ref 0–6)
ERYTHROCYTE [DISTWIDTH] IN BLOOD BY AUTOMATED COUNT: 11.9 % (ref 11.6–15.1)
EST. AVERAGE GLUCOSE BLD GHB EST-MCNC: 97 MG/DL
FERRITIN SERPL-MCNC: 139 NG/ML (ref 8–388)
GFR SERPL CREATININE-BSD FRML MDRD: 80 ML/MIN/1.73SQ M
GLUCOSE P FAST SERPL-MCNC: 81 MG/DL (ref 65–99)
HBA1C MFR BLD: 5 %
HCT VFR BLD AUTO: 48.2 % (ref 36.5–49.3)
HDLC SERPL-MCNC: 44 MG/DL
HGB BLD-MCNC: 16.5 G/DL (ref 12–17)
IMM GRANULOCYTES # BLD AUTO: 0.03 THOUSAND/UL (ref 0–0.2)
IMM GRANULOCYTES NFR BLD AUTO: 1 % (ref 0–2)
IRON SATN MFR SERPL: 39 % (ref 20–50)
IRON SERPL-MCNC: 147 UG/DL (ref 65–175)
LDLC SERPL CALC-MCNC: 91 MG/DL (ref 0–100)
LYMPHOCYTES # BLD AUTO: 1.95 THOUSANDS/ΜL (ref 0.6–4.47)
LYMPHOCYTES NFR BLD AUTO: 35 % (ref 14–44)
MCH RBC QN AUTO: 31.2 PG (ref 26.8–34.3)
MCHC RBC AUTO-ENTMCNC: 34.2 G/DL (ref 31.4–37.4)
MCV RBC AUTO: 91 FL (ref 82–98)
MONOCYTES # BLD AUTO: 0.53 THOUSAND/ΜL (ref 0.17–1.22)
MONOCYTES NFR BLD AUTO: 10 % (ref 4–12)
NEUTROPHILS # BLD AUTO: 2.83 THOUSANDS/ΜL (ref 1.85–7.62)
NEUTS SEG NFR BLD AUTO: 50 % (ref 43–75)
NONHDLC SERPL-MCNC: 107 MG/DL
NRBC BLD AUTO-RTO: 0 /100 WBCS
PLATELET # BLD AUTO: 192 THOUSANDS/UL (ref 149–390)
PMV BLD AUTO: 11.9 FL (ref 8.9–12.7)
POTASSIUM SERPL-SCNC: 4 MMOL/L (ref 3.5–5.3)
PROGEST SERPL-MCNC: 1.3 NG/ML (ref 0.2–1.97)
PROT SERPL-MCNC: 7.7 G/DL (ref 6.4–8.4)
RBC # BLD AUTO: 5.29 MILLION/UL (ref 3.88–5.62)
SODIUM SERPL-SCNC: 137 MMOL/L (ref 135–147)
T3 SERPL-MCNC: 0.8 NG/ML (ref 0.6–1.8)
T4 SERPL-MCNC: 10.6 UG/DL (ref 4.7–13.3)
TIBC SERPL-MCNC: 377 UG/DL (ref 250–450)
TRIGL SERPL-MCNC: 82 MG/DL
TSH SERPL DL<=0.05 MIU/L-ACNC: 1.03 UIU/ML (ref 0.45–4.5)
WBC # BLD AUTO: 5.59 THOUSAND/UL (ref 4.31–10.16)

## 2022-09-06 PROCEDURE — 84443 ASSAY THYROID STIM HORMONE: CPT

## 2022-09-06 PROCEDURE — 84482 T3 REVERSE: CPT

## 2022-09-06 PROCEDURE — 36415 COLL VENOUS BLD VENIPUNCTURE: CPT

## 2022-09-06 PROCEDURE — 82672 ASSAY OF ESTROGEN: CPT

## 2022-09-06 PROCEDURE — 84144 ASSAY OF PROGESTERONE: CPT

## 2022-09-06 PROCEDURE — 83550 IRON BINDING TEST: CPT

## 2022-09-06 PROCEDURE — 80061 LIPID PANEL: CPT

## 2022-09-06 PROCEDURE — 82728 ASSAY OF FERRITIN: CPT

## 2022-09-06 PROCEDURE — 86376 MICROSOMAL ANTIBODY EACH: CPT

## 2022-09-06 PROCEDURE — 84480 ASSAY TRIIODOTHYRONINE (T3): CPT

## 2022-09-06 PROCEDURE — 84436 ASSAY OF TOTAL THYROXINE: CPT

## 2022-09-06 PROCEDURE — 84479 ASSAY OF THYROID (T3 OR T4): CPT

## 2022-09-06 PROCEDURE — 82533 TOTAL CORTISOL: CPT

## 2022-09-06 PROCEDURE — 87338 HPYLORI STOOL AG IA: CPT

## 2022-09-06 PROCEDURE — 80053 COMPREHEN METABOLIC PANEL: CPT

## 2022-09-06 PROCEDURE — 82626 DEHYDROEPIANDROSTERONE: CPT

## 2022-09-06 PROCEDURE — 86160 COMPLEMENT ANTIGEN: CPT

## 2022-09-06 PROCEDURE — 84140 ASSAY OF PREGNENOLONE: CPT

## 2022-09-06 PROCEDURE — 82784 ASSAY IGA/IGD/IGG/IGM EACH: CPT

## 2022-09-06 PROCEDURE — 82787 IGG 1 2 3 OR 4 EACH: CPT

## 2022-09-06 PROCEDURE — 86800 THYROGLOBULIN ANTIBODY: CPT

## 2022-09-06 PROCEDURE — 85025 COMPLETE CBC W/AUTO DIFF WBC: CPT

## 2022-09-06 PROCEDURE — 83540 ASSAY OF IRON: CPT

## 2022-09-06 PROCEDURE — 83036 HEMOGLOBIN GLYCOSYLATED A1C: CPT

## 2022-09-07 LAB
H PYLORI AG STL QL IA: NEGATIVE
T3RU NFR SERPL: 28 % (ref 24–39)
THYROGLOB AB SERPL-ACNC: <1 IU/ML (ref 0–0.9)
THYROPEROXIDASE AB SERPL-ACNC: <8 IU/ML (ref 0–34)

## 2022-09-08 ENCOUNTER — TELEPHONE (OUTPATIENT)
Dept: GASTROENTEROLOGY | Facility: CLINIC | Age: 27
End: 2022-09-08

## 2022-09-08 NOTE — TELEPHONE ENCOUNTER
----- Message from Kong Duran DO sent at 9/7/2022  6:36 PM EDT -----  Please call the patient with the H pylori results  The H pylori was negative

## 2022-09-08 NOTE — TELEPHONE ENCOUNTER
Called and spoke with pt in regards to his H pylori results that it came back negative  Pt voiced understanding and has no further questions

## 2022-09-09 ENCOUNTER — HOSPITAL ENCOUNTER (OUTPATIENT)
Dept: NON INVASIVE DIAGNOSTICS | Facility: CLINIC | Age: 27
Discharge: HOME | End: 2022-09-09
Payer: COMMERCIAL

## 2022-09-09 VITALS
OXYGEN SATURATION: 94 % | HEART RATE: 64 BPM | BODY MASS INDEX: 23.03 KG/M2 | DIASTOLIC BLOOD PRESSURE: 74 MMHG | WEIGHT: 170 LBS | HEIGHT: 72 IN | SYSTOLIC BLOOD PRESSURE: 122 MMHG

## 2022-09-09 VITALS
DIASTOLIC BLOOD PRESSURE: 76 MMHG | HEIGHT: 72 IN | HEART RATE: 72 BPM | WEIGHT: 170 LBS | SYSTOLIC BLOOD PRESSURE: 104 MMHG | BODY MASS INDEX: 23.03 KG/M2

## 2022-09-09 DIAGNOSIS — R00.2 PALPITATIONS: ICD-10-CM

## 2022-09-09 LAB
AORTIC ROOT: 2.6 CM
AORTIC VALVE MEAN VELOCITY: 8.8 M/S
APICAL FOUR CHAMBER EJECTION FRACTION: 63 %
AV LVOT MEAN GRADIENT: 2 MMHG
AV LVOT PEAK GRADIENT: 5 MMHG
AV MEAN GRADIENT: 3 MMHG
AV PEAK GRADIENT: 6 MMHG
AV VELOCITY RATIO: 0.88
CHEST PAIN STATEMENT: NORMAL
DOP CALC AO PEAK VEL: 1.27 M/S
DOP CALC AO VTI: 25.1 CM
DOP CALC LVOT PEAK VEL VTI: 20.6 CM
DOP CALC LVOT PEAK VEL: 1.12 M/S
E WAVE DECELERATION TIME: 329 MS
ESTROGEN SERPL-MCNC: 188 PG/ML (ref 56–213)
FRACTIONAL SHORTENING: 37 % (ref 28–44)
IGG SERPL-MCNC: 881 MG/DL (ref 603–1613)
IGG1 SER-MCNC: 489 MG/DL (ref 248–810)
IGG2 SER-MCNC: 211 MG/DL (ref 130–555)
IGG3 SER-MCNC: 40 MG/DL (ref 15–102)
IGG4 SER-MCNC: 30 MG/DL (ref 2–96)
INTERVENTRICULAR SEPTUM IN DIASTOLE (PARASTERNAL SHORT AXIS VIEW): 0.7 CM
INTERVENTRICULAR SEPTUM: 0.7 CM (ref 0.6–1.1)
LAAS-AP2: 17.1 CM2
LAAS-AP4: 13.5 CM2
LEFT ATRIUM AREA SYSTOLE SINGLE PLANE A4C: 13.4 CM2
LEFT ATRIUM SIZE: 3.1 CM
LEFT INTERNAL DIMENSION IN SYSTOLE: 3.1 CM (ref 2.1–4)
LEFT VENTRICULAR INTERNAL DIMENSION IN DIASTOLE: 4.9 CM (ref 3.5–6)
LEFT VENTRICULAR POSTERIOR WALL IN END DIASTOLE: 0.9 CM
LEFT VENTRICULAR STROKE VOLUME: 76 ML
LVSV (TEICH): 76 ML
MAX DIASTOLIC BP: 76 MMHG
MAX HEART RATE: 184 BPM
MAX HR PERCENT: 96 %
MAX HR: 184 BPM
MAX PREDICTED HEART RATE: 191 BPM
MAX. SYSTOLIC BP: 208 MMHG
MV E'TISSUE VEL-LAT: 21 CM/S
MV E'TISSUE VEL-SEP: 16 CM/S
MV PEAK A VEL: 0.27 M/S
MV PEAK E VEL: 87 CM/S
MV STENOSIS PRESSURE HALF TIME: 95 MS
MV VALVE AREA P 1/2 METHOD: 2.32 CM2
PROTOCOL NAME: NORMAL
RATE PRESSURE PRODUCT: NORMAL
REASON FOR TERMINATION: NORMAL
RIGHT ATRIUM AREA SYSTOLE A4C: 16.8 CM2
RIGHT VENTRICLE ID DIMENSION: 3.9 CM
SL CV LEFT ATRIUM LENGTH A2C: 5.3 CM
SL CV PED ECHO LEFT VENTRICLE DIASTOLIC VOLUME (MOD BIPLANE) 2D: 115 ML
SL CV PED ECHO LEFT VENTRICLE SYSTOLIC VOLUME (MOD BIPLANE) 2D: 39 ML
SL CV STRESS RECOVERY BP: NORMAL MMHG
SL CV STRESS RECOVERY HR: 97 BPM
SL CV STRESS RECOVERY O2 SAT: 100 %
SL CV STRESS STAGE REACHED: 5
STRESS ANGINA INDEX: 1
STRESS BASELINE BP: NORMAL MMHG
STRESS BASELINE HR: 64 BPM
STRESS O2 SAT REST: 94 %
STRESS PEAK HR: 184 BPM
STRESS POST ESTIMATED WORKLOAD: 17.1 METS
STRESS POST EXERCISE DUR MIN: 13 MIN
STRESS POST EXERCISE DUR SEC: 0 SEC
STRESS POST O2 SAT PEAK: 100 %
STRESS POST PEAK BP: 208 MMHG
T3REVERSE SERPL-MCNC: 20.1 NG/DL (ref 9.2–24.1)
TARGET HR FORMULA: NORMAL
TEST INDICATION: NORMAL
TIME IN EXERCISE PHASE: NORMAL
TR MAX PG: 16 MMHG
TR PEAK VELOCITY: 2 M/S
TRICUSPID VALVE PEAK REGURGITATION VELOCITY: 2.01 M/S

## 2022-09-09 PROCEDURE — 93226 XTRNL ECG REC<48 HR SCAN A/R: CPT

## 2022-09-09 PROCEDURE — 93306 TTE W/DOPPLER COMPLETE: CPT | Performed by: INTERNAL MEDICINE

## 2022-09-09 PROCEDURE — 93225 XTRNL ECG REC<48 HRS REC: CPT

## 2022-09-09 PROCEDURE — 93306 TTE W/DOPPLER COMPLETE: CPT

## 2022-09-09 PROCEDURE — 93018 CV STRESS TEST I&R ONLY: CPT | Performed by: INTERNAL MEDICINE

## 2022-09-09 PROCEDURE — 93016 CV STRESS TEST SUPVJ ONLY: CPT | Performed by: INTERNAL MEDICINE

## 2022-09-09 PROCEDURE — 93017 CV STRESS TEST TRACING ONLY: CPT

## 2022-09-10 LAB — DHEA SERPL-MCNC: 453 NG/DL (ref 31–701)

## 2022-09-11 DIAGNOSIS — K21.9 GASTROESOPHAGEAL REFLUX DISEASE WITHOUT ESOPHAGITIS: ICD-10-CM

## 2022-09-11 LAB — PREG SERPL-MCNC: 77 NG/DL

## 2022-09-18 PROCEDURE — 93227 XTRNL ECG REC<48 HR R&I: CPT | Performed by: INTERNAL MEDICINE

## 2022-09-19 ENCOUNTER — OFFICE VISIT (OUTPATIENT)
Dept: PULMONOLOGY | Facility: CLINIC | Age: 27
End: 2022-09-19
Payer: COMMERCIAL

## 2022-09-19 VITALS
OXYGEN SATURATION: 99 % | TEMPERATURE: 97.9 F | DIASTOLIC BLOOD PRESSURE: 73 MMHG | HEART RATE: 72 BPM | WEIGHT: 170 LBS | RESPIRATION RATE: 18 BRPM | HEIGHT: 72 IN | SYSTOLIC BLOOD PRESSURE: 115 MMHG | BODY MASS INDEX: 23.03 KG/M2

## 2022-09-19 DIAGNOSIS — R07.89 OTHER CHEST PAIN: ICD-10-CM

## 2022-09-19 DIAGNOSIS — R09.82 POSTNASAL DRIP: ICD-10-CM

## 2022-09-19 DIAGNOSIS — R00.2 PALPITATION: ICD-10-CM

## 2022-09-19 DIAGNOSIS — K21.9 GASTROESOPHAGEAL REFLUX DISEASE WITHOUT ESOPHAGITIS: ICD-10-CM

## 2022-09-19 DIAGNOSIS — R05.3 CHRONIC COUGH: ICD-10-CM

## 2022-09-19 DIAGNOSIS — R06.02 SOB (SHORTNESS OF BREATH): Primary | ICD-10-CM

## 2022-09-19 PROCEDURE — 99214 OFFICE O/P EST MOD 30 MIN: CPT | Performed by: INTERNAL MEDICINE

## 2022-09-19 RX ORDER — IPRATROPIUM BROMIDE 21 UG/1
2 SPRAY, METERED NASAL EVERY 12 HOURS
Qty: 30 ML | Refills: 6 | Status: SHIPPED | OUTPATIENT
Start: 2022-09-19

## 2022-09-19 NOTE — PROGRESS NOTES
Progress note - Pulmonary Medicine   Denzel Adamson 32 y o  male MRN: [id number]       Impression & Plan:     Chronic cough  Mild, most likely secondary to GERD and possibly postnasal drip  Doubt cough variant asthma but unfortunately I sent patient for methacholine challenge test but was not done  Regardless I believe the patient does not have asthma in the future we can try inhaled corticosteroids  Currently patient would prefer to monitor and not to try any inhalers  Postnasal drip  I will start nasal ipratropium and evaluate, in the future we can try azelastine    SOB (shortness of breath)  Patient does not have any clear pulmonary disease, the only thing he might have is underlying asthma , unfortunately did not have the methacholine challenge test done but in the future if he continues to have symptoms I can try to convince him to try inhaled corticosteroids with long-acting beta agonist to see if his symptoms improved  I encouraged patient to start exercise again specially after he sees his cardiologist in 2 weeks  GERD (gastroesophageal reflux disease)  Continue omeprazole, follow with GI and also pH manometry    Palpitation  Patient had Holter monitor, follow with Cardiology    Other chest pain  Most likely secondary to dyspepsia and GERD  His workup so far was negative but I noticed on echocardiogram that his ejection fraction is on the lower normal at 50-55 percent which could be secondary to echo technique  Patient will follow with his cardiologist in 2 weeks        Diagnoses and all orders for this visit:    SOB (shortness of breath)    Gastroesophageal reflux disease without esophagitis    Postnasal drip  -     ipratropium (ATROVENT) 0 03 % nasal spray; 2 sprays into each nostril every 12 (twelve) hours    Palpitation    Other chest pain    Chronic cough      ______________________________________________________________________    HPI:    Denzel Adamson presents today for follow-up of multiple vague complaints including mild shortness of breath and mild cough from time to time associated with postnasal drip  He also has intermittent chest pain with palpitation  He has acid reflux also was treated with pantoprazole but he could not tolerate due to diarrhea and vomiting and currently on omeprazole but he feels that PPI gives him transient relief only  From time to time he will get this substernal chest pain that radiates to his left chest and associated with shortness of breath  He cramps as a spasm  Otherwise he denies any fever or chills or night sweats, out of the above episodes he does not have any shortness of breath, he does not have any wheezing  He has postnasal drip and tried Flonase in the past but that caused him epistaxis  He knows that he has deviated septum and was offered surgical procedure but he declined in the past   Recently had extensive workup by GI and Cardiology, he had echocardiogram and cardiac stress test and Holter monitor and also had EGD and planned for pH manomety  Current Medications:    Current Outpatient Medications:     ALPRAZolam (XANAX) 0 5 mg tablet, Take 0 5 mg by mouth 2 (two) times a day as needed, Disp: , Rfl:     esomeprazole (NexIUM) 20 mg capsule, TAKE 2 CAPSULES (40 MG TOTAL) BY MOUTH DAILY IN THE EARLY MORNING, Disp: 30 capsule, Rfl: 6    pantoprazole (PROTONIX) 40 mg tablet, Take 1 tablet (40 mg total) by mouth daily, Disp: 31 tablet, Rfl: 6    Review of Systems:  Review of Systems   Constitutional: Negative  Negative for appetite change and fever  HENT: Positive for postnasal drip, rhinorrhea and sneezing  Negative for ear pain, sore throat and trouble swallowing  Eyes: Negative  Respiratory: Positive for cough and shortness of breath  Cardiovascular: Positive for chest pain  Gastrointestinal: Negative  Endocrine: Negative  Genitourinary: Negative  Musculoskeletal: Positive for myalgias  Skin: Negative  Allergic/Immunologic: Negative  Neurological: Negative  Negative for headaches  Hematological: Negative  Psychiatric/Behavioral: Negative  Aside from what is mentioned in the HPI, the review of systems is otherwise negative    Past medical history, surgical history, and family history were reviewed and updated as appropriate    Social history updates:  Social History     Tobacco Use   Smoking Status Never Smoker   Smokeless Tobacco Never Used       PhysicalExamination:  Vitals:   /73 (BP Location: Left arm, Patient Position: Sitting, Cuff Size: Large)   Pulse 72   Temp 97 9 °F (36 6 °C) (Tympanic)   Resp 18   Ht 6' (1 829 m)   Wt 77 1 kg (170 lb)   SpO2 99%   BMI 23 06 kg/m²     General: alert, not in acute distress  HEENT: PERRL, no icteric sclera or cyanosis, no thrush  Neck:  Supple, no lymphadenopathy or thyromegaly, no JVD  Lungs:  Equal breath sounds and clear auscultations bilaterally, no wheezing or crackles  Heart: S1S2 regular, no murmurs or gallops  Abdomen: soft, nontender, bowel sounds  present  Extremities: no edema, no clubbing or cyanosis  Neuro: Alert and oriented x 3, no focal neurodeficits   Skin: intact, no rashes    Diagnostic Data:  Labs: I personally reviewed the most recent laboratory data pertinent to today's visit    Lab Results   Component Value Date    WBC 5 59 09/06/2022    HGB 16 5 09/06/2022    HCT 48 2 09/06/2022    MCV 91 09/06/2022     09/06/2022     Lab Results   Component Value Date    SODIUM 137 09/06/2022    K 4 0 09/06/2022    CO2 27 09/06/2022     09/06/2022    BUN 18 09/06/2022    CREATININE 1 22 09/06/2022    CALCIUM 9 6 09/06/2022       PFT results: The most recent pulmonary function tests were reviewed    · Normal spirometry with no obstruction normal vital capacity  · No significant bronchodilator response but he had good response at the level of FEF 25-75% which may indicate small airway disease  · Normal lung volumes  · Normal diffusion capacity  · Normal flow volume loop    Imaging:  I personally reviewed the images on the [employer] system pertinent to today's visit  Chest x-ray reviewed on PACs: Clear lungs    Other studies:  Egd:  IMPRESSION:  1  Normal EGD  2   Gastroesophageal reflux disease without esophagitis     Echocardiogram:  LVEF 50-55%, normal RV    Marv Gamble MD  Answers for HPI/ROS submitted by the patient on 9/12/2022  Do you have chest tightness?: Yes  Do you have difficulty breathing?: Yes  Chronicity: chronic  When did you first notice your symptoms?: more than 1 month ago  How often do your symptoms occur?: daily  Since you first noticed this problem, how has it changed?: gradually improving  Do you have shortness of breath that occurs with effort or exertion?: Yes  Do you have ear congestion?: No  Do you have heartburn?: Yes  Do you have fatigue?: Yes  Do you have nasal congestion?: Yes  Do you have shortness of breath when lying flat?: Yes  Do you have shortness of breath when you wake up?: No  Do you have sweats?: No  Have you experienced weight loss?: No  Which of the following makes your symptoms worse?: any activity, eating, emotional stress, exercise, minimal activity, strenuous activity  Which of the following makes your symptoms better?: change in position, rest

## 2022-09-19 NOTE — ASSESSMENT & PLAN NOTE
Mild, most likely secondary to GERD and possibly postnasal drip  Doubt cough variant asthma but unfortunately I sent patient for methacholine challenge test but was not done  Regardless I believe the patient does not have asthma in the future we can try inhaled corticosteroids  Currently patient would prefer to monitor and not to try any inhalers

## 2022-09-19 NOTE — ASSESSMENT & PLAN NOTE
Patient does not have any clear pulmonary disease, the only thing he might have is underlying asthma , unfortunately did not have the methacholine challenge test done but in the future if he continues to have symptoms I can try to convince him to try inhaled corticosteroids with long-acting beta agonist to see if his symptoms improved  I encouraged patient to start exercise again specially after he sees his cardiologist in 2 weeks

## 2022-09-19 NOTE — ASSESSMENT & PLAN NOTE
Most likely secondary to dyspepsia and GERD  His workup so far was negative but I noticed on echocardiogram that his ejection fraction is on the lower normal at 50-55 percent which could be secondary to echo technique  Patient will follow with his cardiologist in 2 weeks

## 2022-09-26 ENCOUNTER — TELEPHONE (OUTPATIENT)
Dept: GASTROENTEROLOGY | Facility: CLINIC | Age: 27
End: 2022-09-26

## 2022-09-26 ENCOUNTER — TELEPHONE (OUTPATIENT)
Dept: GASTROENTEROLOGY | Facility: AMBULARY SURGERY CENTER | Age: 27
End: 2022-09-26

## 2022-09-26 DIAGNOSIS — K21.9 GASTROESOPHAGEAL REFLUX DISEASE WITHOUT ESOPHAGITIS: ICD-10-CM

## 2022-09-26 RX ORDER — ESOMEPRAZOLE MAGNESIUM 40 MG/1
40 CAPSULE, DELAYED RELEASE ORAL
Qty: 30 CAPSULE | Refills: 3 | Status: SHIPPED | OUTPATIENT
Start: 2022-09-26 | End: 2022-10-18

## 2022-09-26 NOTE — TELEPHONE ENCOUNTER
Pt called because his insurance will not approve the Nexium written as 2 pills  The will only cover it at 1 pill (equaling the dose of 2 pills)  He can be reached back at [phone]

## 2022-09-26 NOTE — TELEPHONE ENCOUNTER
Patient called stating he script for Kuldip Phillips is not at pharmacy  Can you please resend? Please advise  Thank you!

## 2022-09-26 NOTE — TELEPHONE ENCOUNTER
Patients GI provider:  Dr Norman Bentley     Number to return call: [phone]     Reason for call: Pt calling requesting to please resend the nexium because pharmacy has not filled the prescription yet       Scheduled procedure/appointment date if applicable: Apt/procedure n/a

## 2022-09-26 NOTE — TELEPHONE ENCOUNTER
Called and spoke to patient  Let him know that Latrell Pacheco resent script for Nexium and sent it as 1 pill a day, 40mg   Patient voiced understanding and had no further questions or concerns

## 2022-09-29 ENCOUNTER — OFFICE VISIT (OUTPATIENT)
Dept: CARDIOLOGY CLINIC | Facility: CLINIC | Age: 27
End: 2022-09-29
Payer: COMMERCIAL

## 2022-09-29 VITALS
OXYGEN SATURATION: 99 % | WEIGHT: 168 LBS | HEIGHT: 72 IN | SYSTOLIC BLOOD PRESSURE: 108 MMHG | DIASTOLIC BLOOD PRESSURE: 70 MMHG | HEART RATE: 72 BPM | BODY MASS INDEX: 22.75 KG/M2 | RESPIRATION RATE: 16 BRPM

## 2022-09-29 DIAGNOSIS — R07.89 OTHER CHEST PAIN: Primary | ICD-10-CM

## 2022-09-29 PROCEDURE — 99211 OFF/OP EST MAY X REQ PHY/QHP: CPT | Performed by: INTERNAL MEDICINE

## 2022-09-29 NOTE — PROGRESS NOTES
Cardiology Follow Up    Denzel Adamson  1995  [id number]  [facility] [address] [address] [phone]    1  Other chest pain          Interval History:  24-year-old man with chest pains  He continues to have chest pain and points to a spot in the lower costochondral area of the sternum      He had a Holter monitor, echo, and stress test and they were all normal     Patient Active Problem List   Diagnosis    SOB (shortness of breath)    Palpitation    Other chest pain    Chronic cough    Postnasal drip    GERD (gastroesophageal reflux disease)     Past Medical History:   Diagnosis Date    GERD (gastroesophageal reflux disease) 2021    Started mid-     Social History     Socioeconomic History    Marital status: Single     Spouse name: Not on file    Number of children: Not on file    Years of education: Not on file    Highest education level: Not on file   Occupational History    Not on file   Tobacco Use    Smoking status: Never Smoker    Smokeless tobacco: Never Used   Vaping Use    Vaping Use: Never used   Substance and Sexual Activity    Alcohol use: Not Currently     Comment: soc    Drug use: Not Currently     Comment: Currently not on RX    Sexual activity: Yes     Partners: Female     Birth control/protection: Other     Comment: Female - Birth Control Pill   Other Topics Concern    Not on file   Social History Narrative    Not on file     Social Determinants of Health     Financial Resource Strain: Not on file   Food Insecurity: Not on file   Transportation Needs: Not on file   Physical Activity: Not on file   Stress: Not on file   Social Connections: Not on file   Intimate Partner Violence: Not on file   Housing Stability: Not on file      Family History   Problem Relation Age of Onset    Heart failure Paternal Grandfather          of heart attack at age ~51    Diabetes Maternal Uncle      Past Surgical History:   Procedure Laterality Date    EGD      GUM SURGERY         Current Outpatient Medications:     esomeprazole (NexIUM) 40 MG capsule, Take 1 capsule (40 mg total) by mouth daily in the early morning, Disp: 30 capsule, Rfl: 3    ALPRAZolam (XANAX) 0 5 mg tablet, Take 0 5 mg by mouth 2 (two) times a day as needed, Disp: , Rfl:     ipratropium (ATROVENT) 0 03 % nasal spray, 2 sprays into each nostril every 12 (twelve) hours, Disp: 30 mL, Rfl: 6    pantoprazole (PROTONIX) 40 mg tablet, Take 1 tablet (40 mg total) by mouth daily, Disp: 31 tablet, Rfl: 6  Allergies   Allergen Reactions    Amoxicillin Other (See Comments)    Iodine - Food Allergy Other (See Comments)       Labs:  Hospital Outpatient Visit on 09/09/2022   Component Date Value    Angina Index 09/09/2022 1     Baseline HR 09/09/2022 64     Baseline BP 09/09/2022 122/74     O2 sat rest 09/09/2022 94     Stress peak HR 09/09/2022 184     Post peak BP 09/09/2022 208     Rate Pressure Product 09/09/2022 38,272 0     O2 sat peak 09/09/2022 100     Recovery HR 09/09/2022 97     Recovery BP 09/09/2022 128/60     O2 sat recovery 09/09/2022 100     Max HR 09/09/2022 184     Max HR Percent 09/09/2022 96     Exercise duration (min) 09/09/2022 13     Exercise duration (sec) 09/09/2022 0     Estimated workload 09/09/2022 17 1     Stress Stage Reached 09/09/2022 5 0     Protocol Name 09/09/2022 ELFEGO     Time In Exercise Phase 09/09/2022 00:12:59     MAX   SYSTOLIC BP 30/85/2060 461     Max Diastolic Bp 97/96/0974 76     Max Heart Rate 09/09/2022 184     Max Predicted Heart Rate 09/09/2022 191     Reason for Termination 09/09/2022 Target Heart Rate Achieved     Test Indication 09/09/2022 Palpitations, chest pain     Target Hr Formular 09/09/2022 (220 - Age)*85%     Chest Pain Statement 09/09/2022 non-limiting 4/10    Hospital Outpatient Visit on 09/09/2022   Component Date Value    LA size 09/09/2022 3 1     Aortic valve mean veloci* 09/09/2022 8 80     Triscuspid Valve Regurgi* 09/09/2022 16 0     Tricuspid valve peak reg* 09/09/2022 2 01     LVPWd 09/09/2022 0 90     Left Atrium Area-systoli* 09/09/2022 17 1     Left Atrium Area-systoli* 09/09/2022 13 5     MV E' Tissue Velocity Se* 09/09/2022 16     MV E' Tissue Velocity La* 09/09/2022 21     TR Peak Flynn 09/09/2022 2 0     IVSd 09/09/2022 5 40     LV DIASTOLIC VOLUME (MOD* 77/27/0325 115     LEFT VENTRICLE SYSTOLIC * 33/25/2689 39     Left ventricular stroke * 09/09/2022 76 00     A4C EF 09/09/2022 63     LA length (A2C) 09/09/2022 5 30     LVIDd 09/09/2022 4 90     IVS 09/09/2022 0 7     LVIDS 09/09/2022 3 10     FS 09/09/2022 37     Ao root 09/09/2022 2 60     RVID d 09/09/2022 3 9     LVOT mn grad 09/09/2022 2 0     AV mean gradient 09/09/2022 3     AV LVOT peak gradient 09/09/2022 5     MV valve area p 1/2 meth* 09/09/2022 2 32     E wave deceleration time 09/09/2022 329     LVOT peak flynn 09/09/2022 1 12     LVOT peak VTI 09/09/2022 20 6     Aortic valve peak veloci* 09/09/2022 1 27     Ao VTI 09/09/2022 25 1     AV peak gradient 09/09/2022 6     MV Peak E Flynn 09/09/2022 87     MV Peak A Flynn 09/09/2022 0 27     MICHAEL A4C 09/09/2022 13 4     RAA A4C 09/09/2022 16 8     MV stenosis pressure 1/2* 09/09/2022 95     LVSV, 2D 09/09/2022 76     Dimensionless velociy in* 09/09/2022 0 88    Appointment on 09/06/2022   Component Date Value    H pylori Ag, Stl 09/06/2022 Negative    Appointment on 09/06/2022   Component Date Value    WBC 09/06/2022 5 59     RBC 09/06/2022 5 29     Hemoglobin 09/06/2022 16 5     Hematocrit 09/06/2022 48 2     MCV 09/06/2022 91     MCH 09/06/2022 31 2     MCHC 09/06/2022 34 2     RDW 09/06/2022 11 9     MPV 09/06/2022 11 9     Platelets 37/41/8228 192     nRBC 09/06/2022 0     Neutrophils Relative 09/06/2022 50     Immat GRANS % 09/06/2022 1     Lymphocytes Relative 09/06/2022 35     Monocytes Relative 09/06/2022 10     Eosinophils Relative 09/06/2022 3     Basophils Relative 09/06/2022 1     Neutrophils Absolute 09/06/2022 2 83     Immature Grans Absolute 09/06/2022 0 03     Lymphocytes Absolute 09/06/2022 1 95     Monocytes Absolute 09/06/2022 0 53     Eosinophils Absolute 09/06/2022 0 18     Basophils Absolute 09/06/2022 0 07     Sodium 09/06/2022 137     Potassium 09/06/2022 4 0     Chloride 09/06/2022 105     CO2 09/06/2022 27     ANION GAP 09/06/2022 5     BUN 09/06/2022 18     Creatinine 09/06/2022 1 22     Glucose, Fasting 09/06/2022 81     Calcium 09/06/2022 9 6     AST 09/06/2022 11     ALT 09/06/2022 25     Alkaline Phosphatase 09/06/2022 57     Total Protein 09/06/2022 7 7     Albumin 09/06/2022 4 3     Total Bilirubin 09/06/2022 0 53     eGFR 09/06/2022 80     C4, COMPLEMENT 09/06/2022 26 0     C3 Complement 09/06/2022 109 0     Cholesterol 09/06/2022 151     Triglycerides 09/06/2022 82     HDL, Direct 09/06/2022 44     LDL Calculated 09/06/2022 91     Non-HDL-Chol (CHOL-HDL) 09/06/2022 107     Hemoglobin A1C 09/06/2022 5 0     EAG 09/06/2022 97     T3, Reverse 09/06/2022 20 1     T3 Uptake Ratio 09/06/2022 28     T3, Total 09/06/2022 0 80     T4 TOTAL 09/06/2022 10 6     PREGNENOLONE, MS 09/06/2022 77     DHEA 09/06/2022 453     Cortisol, Random 09/06/2022 19 5     Estrogen 09/06/2022 188     Progesterone 09/06/2022 1 30     IgG 1 09/06/2022 489     IgG 2 09/06/2022 211     IgG 3 09/06/2022 40     IgG 4 09/06/2022 30     IgG 09/06/2022 881     TSH 3RD GENERATON 09/06/2022 1 030     Iron Saturation 09/06/2022 39     TIBC 09/06/2022 377     Iron 09/06/2022 147     Ferritin 09/06/2022 139     THYROID MICROSOMAL ANTIB* 09/06/2022 <8     Thyroglobulin Ab 09/06/2022 <1 0      Imaging: Stress test only, exercise    Result Date: 9/9/2022  Narrative: Cherelle Aguilar  Stress ECG: The stress ECG is negative for ischemia after maximal exercise, without reproduction of symptoms  Stress strip    Result Date: 9/9/2022  Narrative: Confirmed by Keisha Gibbons ([id number]),  Diann Robertson ([id number]) on 9/9/2022 2:17:05 PM    Echo complete w/ contrast if indicated    Result Date: 9/9/2022  Narrative: [facility]  Left Ventricle: Left ventricular cavity size is normal  Wall thickness is normal  Systolic function is low normal (50-55%)  Wall motion is normal  Diastolic function is normal    Right Ventricle: Right ventricular cavity size is normal  Systolic function is normal    Mitral Valve: There is trace regurgitation    Tricuspid Valve: There is trace regurgitation  Holter monitor    Result Date: 9/18/2022  Narrative: INDICATIONS: Palpitations DESCRIPTION OF FINDINGS: The patient was in normal sinus rhythm throughout the study  The average heart rate was 71 beats per minute  The heart rate ranged from 46 to 124 beats per minute  Ventricular ectopic activity consisted of 168 beats (0 2% of total beats)  There was no sustained or nonsustained ventricular tachycardia  Supraventricular ectopic activity consisted of 45 beats (<0 1% of total beats)  There was no supraventricular tachycardia identified  There was no evidence of atrial fibrillation or atrial flutter  There were no significant pauses  There was no evidence of advanced degree heart block  Symptoms of "chest pain, shortness of breath" correlated with normal sinus rhythm, and sinus tachycardia  Impression: The patient was in normal sinus rhythm throughout the study  Rare ectopy as noted above No significant arrhythmia noted No symptom-rhythm correlation noted  ----- Fany Carroll MD [facility]       Review of Systems:  Review of Systems    Physical Exam:  108/70  Seventy-two, regular  Lungs clear  Mild tenderness over the costochondral joint  Discussion/Summary:    1  Noncardiac chest pain  2 ?  Costochondritis    Recommendations:    1  Reassurance  2   Follow-up lizet Palomino MD

## 2022-10-18 DIAGNOSIS — K21.9 GASTROESOPHAGEAL REFLUX DISEASE WITHOUT ESOPHAGITIS: ICD-10-CM

## 2022-10-18 RX ORDER — ESOMEPRAZOLE MAGNESIUM 40 MG/1
40 CAPSULE, DELAYED RELEASE ORAL
Qty: 90 CAPSULE | Refills: 2 | Status: SHIPPED | OUTPATIENT
Start: 2022-10-18

## 2022-11-02 ENCOUNTER — HOSPITAL ENCOUNTER (OUTPATIENT)
Dept: MRI IMAGING | Facility: CLINIC | Age: 27
Discharge: HOME | End: 2022-11-02

## 2022-11-02 DIAGNOSIS — M51.14 INTERVERTEBRAL DISC DISORDERS WITH RADICULOPATHY, THORACIC REGION: ICD-10-CM

## 2022-12-06 ENCOUNTER — APPOINTMENT (OUTPATIENT)
Dept: LAB | Facility: CLINIC | Age: 27
End: 2022-12-06

## 2022-12-06 DIAGNOSIS — R53.83 OTHER FATIGUE: ICD-10-CM

## 2022-12-06 LAB
ALBUMIN SERPL BCP-MCNC: 4 G/DL (ref 3.5–5)
ALP SERPL-CCNC: 66 U/L (ref 46–116)
ALT SERPL W P-5'-P-CCNC: 43 U/L (ref 12–78)
ANION GAP SERPL CALCULATED.3IONS-SCNC: 3 MMOL/L (ref 4–13)
AST SERPL W P-5'-P-CCNC: 18 U/L (ref 5–45)
BILIRUB SERPL-MCNC: 0.47 MG/DL (ref 0.2–1)
BUN SERPL-MCNC: 18 MG/DL (ref 5–25)
CALCIUM SERPL-MCNC: 9.4 MG/DL (ref 8.3–10.1)
CHLORIDE SERPL-SCNC: 106 MMOL/L (ref 96–108)
CO2 SERPL-SCNC: 28 MMOL/L (ref 21–32)
CREAT SERPL-MCNC: 1.15 MG/DL (ref 0.6–1.3)
ERYTHROCYTE [DISTWIDTH] IN BLOOD BY AUTOMATED COUNT: 11.9 % (ref 11.6–15.1)
GFR SERPL CREATININE-BSD FRML MDRD: 85 ML/MIN/1.73SQ M
GLUCOSE P FAST SERPL-MCNC: 76 MG/DL (ref 65–99)
HCT VFR BLD AUTO: 47.2 % (ref 36.5–49.3)
HGB BLD-MCNC: 15.2 G/DL (ref 12–17)
MCH RBC QN AUTO: 29.2 PG (ref 26.8–34.3)
MCHC RBC AUTO-ENTMCNC: 32.2 G/DL (ref 31.4–37.4)
MCV RBC AUTO: 91 FL (ref 82–98)
PLATELET # BLD AUTO: 192 THOUSANDS/UL (ref 149–390)
PMV BLD AUTO: 11.6 FL (ref 8.9–12.7)
POTASSIUM SERPL-SCNC: 4.1 MMOL/L (ref 3.5–5.3)
PROT SERPL-MCNC: 7.2 G/DL (ref 6.4–8.4)
RBC # BLD AUTO: 5.2 MILLION/UL (ref 3.88–5.62)
SODIUM SERPL-SCNC: 137 MMOL/L (ref 135–147)
WBC # BLD AUTO: 5.39 THOUSAND/UL (ref 4.31–10.16)

## 2022-12-16 ENCOUNTER — TELEPHONE (OUTPATIENT)
Dept: PULMONOLOGY | Facility: CLINIC | Age: 27
End: 2022-12-16

## 2023-01-05 ENCOUNTER — OFFICE VISIT (OUTPATIENT)
Dept: INTERNAL MEDICINE CLINIC | Facility: CLINIC | Age: 28
End: 2023-01-05

## 2023-01-05 ENCOUNTER — APPOINTMENT (OUTPATIENT)
Dept: LAB | Facility: CLINIC | Age: 28
End: 2023-01-05

## 2023-01-05 VITALS
TEMPERATURE: 98.4 F | OXYGEN SATURATION: 100 % | WEIGHT: 168 LBS | SYSTOLIC BLOOD PRESSURE: 118 MMHG | HEIGHT: 72 IN | RESPIRATION RATE: 20 BRPM | BODY MASS INDEX: 22.75 KG/M2 | HEART RATE: 74 BPM | DIASTOLIC BLOOD PRESSURE: 70 MMHG

## 2023-01-05 DIAGNOSIS — Z00.00 ADULT GENERAL MEDICAL EXAMINATION: ICD-10-CM

## 2023-01-05 DIAGNOSIS — U09.9 COVID-19 LONG HAULER: ICD-10-CM

## 2023-01-05 DIAGNOSIS — Z00.00 ADULT GENERAL MEDICAL EXAMINATION: Primary | ICD-10-CM

## 2023-01-05 DIAGNOSIS — Z23 ENCOUNTER FOR IMMUNIZATION: ICD-10-CM

## 2023-01-05 PROBLEM — R00.2 PALPITATION: Status: RESOLVED | Noted: 2022-07-07 | Resolved: 2023-01-05

## 2023-01-05 NOTE — PROGRESS NOTES
ADULT ANNUAL [address]     NAME: Denzel Adamson  AGE: 32 y o  SEX: male  : 1995     DATE: 2023     Assessment and Plan:     1  Adult general medical examination  - Varicella zoster antibody, IgG; Future    2  COVID-19 long hauler    Reassured him based off of all the results of his testing  I do think it may be beneficial for him to go the gym  May improve his energy level over time  Hopefully symptoms eventually end of going away on their own but right now no specific treatment  He does not want any anti-anxiety medication  Hard to decipher whether anxiety is playing any role here  Will monitor  3  Encounter for immunization  - TDAP VACCINE GREATER THAN OR EQUAL TO 8YO IM     Immunizations and preventive care screenings were discussed with patient today  Appropriate education was printed on patient's after visit summary  Counseling:  Alcohol/drug use: discussed moderation in alcohol intake, the recommendations for healthy alcohol use, and avoidance of illicit drug use  Dental Health: discussed importance of regular tooth brushing, flossing, and dental visits  Injury prevention: discussed safety/seat belts, safety helmets, smoke detectors, carbon dioxide detectors, and smoking near bedding or upholstery  Sexual health: discussed sexually transmitted diseases, partner selection, use of condoms, avoidance of unintended pregnancy, and contraceptive alternatives  · Exercise: the importance of regular exercise/physical activity was discussed  Recommend exercise 3-5 times per week for at least 30 minutes            Return in about 1 year (around 2024) for Annual Physical      Chief Complaint:     Chief Complaint   Patient presents with   • Establish Care     Since having covid  feeling chest pn and sob    • Back Pain   • Shortness of Breath   • Anxiety   • Fatigue      History of Present Illness:     Adult Annual Physical Patient here for a comprehensive physical exam  He is new to the practice  Generally healthy but since sharon covid-19 in feb 22, he has had a myriad of symptoms such as pain, shortness of breath, palpitations, increased anxiety, fatigue, brain fog, chest pain  He has seen multiple specialists including GI, cardiology, and pulmonary  His old pcp told him to see a therapist to deal with his anxiety  He did gain a bunch of weight dealing with everything at the time, but now has lost the weight  Worried about going to the gym though knows that everything is ok based off of all the tests he has had  Depression Screening  PHQ-2/9 Depression Screening    Little interest or pleasure in doing things: 0 - not at all  Feeling down, depressed, or hopeless: 0 - not at all  PHQ-2 Score: 0  PHQ-2 Interpretation: Negative depression screen        Review of Systems:     Review of Systems   Constitutional: Positive for fatigue  Negative for activity change and appetite change  HENT: Negative  Respiratory: Positive for shortness of breath  Negative for apnea, cough, chest tightness and wheezing  Cardiovascular: Positive for chest pain  Negative for palpitations and leg swelling  Gastrointestinal: Negative for abdominal distention, abdominal pain, blood in stool, constipation, diarrhea, nausea and vomiting  Genitourinary: Negative  Musculoskeletal: Positive for back pain  Negative for arthralgias, gait problem, joint swelling and myalgias  Skin: Negative for rash and wound  Neurological: Negative for dizziness, weakness, light-headedness, numbness and headaches  Psychiatric/Behavioral: Negative for behavioral problems, confusion, hallucinations, sleep disturbance and suicidal ideas  The patient is not nervous/anxious         Past Medical History:     Past Medical History:   Diagnosis Date   • GERD (gastroesophageal reflux disease) 6/2021    Started mid-2021      Past Surgical History:     Past Surgical History:   Procedure Laterality Date   • EGD     • GUM SURGERY        Social History:     Social History     Socioeconomic History   • Marital status: Single     Spouse name: None   • Number of children: None   • Years of education: None   • Highest education level: None   Occupational History   • None   Tobacco Use   • Smoking status: Never   • Smokeless tobacco: Never   Vaping Use   • Vaping Use: Never used   Substance and Sexual Activity   • Alcohol use: Not Currently     Comment: soc   • Drug use: Not Currently     Comment: Currently not on RX   • Sexual activity: Yes     Partners: Female     Birth control/protection: Other     Comment: Female - Birth Control Pill   Other Topics Concern   • None   Social History Narrative   • None     Social Determinants of Health     Financial Resource Strain: Not on file   Food Insecurity: Not on file   Transportation Needs: Not on file   Physical Activity: Not on file   Stress: Not on file   Social Connections: Not on file   Intimate Partner Violence: Not on file   Housing Stability: Not on file      Family History:     Family History   Problem Relation Age of Onset   • Heart failure Paternal Grandfather          of heart attack at age ~51   • Diabetes Maternal Uncle       Current Medications:     No current outpatient medications on file  No current facility-administered medications for this visit  Allergies: Allergies   Allergen Reactions   • Amoxicillin Other (See Comments)   • Iodine - Food Allergy Other (See Comments)      Physical Exam:     /70 (BP Location: Left arm, Patient Position: Sitting, Cuff Size: Standard)   Pulse 74   Temp 98 4 °F (36 9 °C) (Temporal)   Resp 20   Ht 6' (1 829 m)   Wt 76 2 kg (168 lb)   SpO2 100%   BMI 22 78 kg/m²     Physical Exam  Vitals reviewed  Constitutional:       General: He is not in acute distress  Appearance: He is well-developed  He is not diaphoretic     HENT:      Right Ear: Tympanic membrane, ear canal and external ear normal  There is no impacted cerumen  Left Ear: Tympanic membrane, ear canal and external ear normal  There is no impacted cerumen  Eyes:      General: No scleral icterus  Right eye: No discharge  Left eye: No discharge  Conjunctiva/sclera: Conjunctivae normal    Neck:      Thyroid: No thyromegaly  Vascular: No JVD  Cardiovascular:      Rate and Rhythm: Normal rate and regular rhythm  Heart sounds: Normal heart sounds  No murmur heard  Pulmonary:      Effort: Pulmonary effort is normal  No respiratory distress  Breath sounds: Normal breath sounds  No wheezing or rales  Abdominal:      General: Bowel sounds are normal  There is no distension  Palpations: Abdomen is soft  Tenderness: There is no abdominal tenderness  Musculoskeletal:      Cervical back: Neck supple  Right lower leg: No edema  Left lower leg: No edema  Lymphadenopathy:      Cervical: No cervical adenopathy  Skin:     General: Skin is warm and dry  Neurological:      Mental Status: He is alert  Mental status is at baseline  Cranial Nerves: No cranial nerve deficit  Motor: No weakness     Psychiatric:         Mood and Affect: Mood normal          Behavior: Behavior normal           [place],    MEDICAL [id number]

## 2023-01-05 NOTE — PATIENT INSTRUCTIONS
Wellness Visit for Adults   AMBULATORY CARE:   A wellness visit  is when you see your healthcare provider to get screened for health problems  Your healthcare provider will also give you advice on how to stay healthy  Write down your questions so you remember to ask them  Ask your healthcare provider how often you should have a wellness visit  What happens at a wellness visit:  Your healthcare provider will ask about your health, and your family history of health problems  This includes high blood pressure, heart disease, and cancer  He or she will ask if you have symptoms that concern you, if you smoke, and about your mood  You may also be asked about your intake of medicines, supplements, food, and alcohol  Any of the following may be done: Your weight  will be checked  Your height may also be checked so your body mass index (BMI) can be calculated  Your BMI shows if you are at a healthy weight  Your blood pressure  and heart rate will be checked  Your temperature may also be checked  Blood and urine tests  may be done  Blood tests may be done to check your cholesterol levels  Abnormal cholesterol levels increase your risk for heart disease and stroke  You may also need a blood or urine test to check for diabetes if you are at increased risk  Urine tests may be done to look for signs of an infection or kidney disease  A physical exam  includes checking your heartbeat and lungs with a stethoscope  Your healthcare provider may also check your skin to look for sun damage  Screening tests  may be recommended  A screening test is done to check for diseases that may not cause symptoms  The screening tests you may need depend on your age, gender, family history, and lifestyle habits  For example, colorectal screening may be recommended if you are 48years old or older  Screening tests you need if you are a woman:   A Pap smear  is used to screen for cervical cancer   Pap smears are usually done every 3 to 5 years depending on your age  You may need them more often if you have had abnormal Pap smear test results in the past  Ask your healthcare provider how often you should have a Pap smear  A mammogram  is an x-ray of your breasts to screen for breast cancer  Experts recommend mammograms every 2 years starting at age 48 years  You may need a mammogram at age 52 years or younger if you have an increased risk for breast cancer  Talk to your healthcare provider about when you should start having mammograms and how often you need them  Vaccines you may need:   Get an influenza vaccine  every year  The influenza vaccine protects you from the flu  Several types of viruses cause the flu  The viruses change over time, so new vaccines are made each year  Get a tetanus-diphtheria (Td) booster vaccine  every 10 years  This vaccine protects you against tetanus and diphtheria  Tetanus is a severe infection that may cause painful muscle spasms and lockjaw  Diphtheria is a severe bacterial infection that causes a thick covering in the back of your mouth and throat  Get a human papillomavirus (HPV) vaccine  if you are female and aged 23 to 32 or male 23 to 24 and never received it  This vaccine protects you from HPV infection  HPV is the most common infection spread by sexual contact  HPV may also cause vaginal, penile, and anal cancers  Get a pneumococcal vaccine  if you are aged 72 years or older  The pneumococcal vaccine is an injection given to protect you from pneumococcal disease  Pneumococcal disease is an infection caused by pneumococcal bacteria  The infection may cause pneumonia, meningitis, or an ear infection  Get a shingles vaccine  if you are 60 or older, even if you have had shingles before  The shingles vaccine is an injection to protect you from the varicella-zoster virus  This is the same virus that causes chickenpox   Shingles is a painful rash that develops in people who had chickenpox or have been exposed to the virus  How to eat healthy:  My Plate is a model for planning healthy meals  It shows the types and amounts of foods that should go on your plate  Fruits and vegetables make up about half of your plate, and grains and protein make up the other half  A serving of dairy is included on the side of your plate  The amount of calories and serving sizes you need depends on your age, gender, weight, and height  Examples of healthy foods are listed below:  Eat a variety of vegetables  such as dark green, red, and orange vegetables  You can also include canned vegetables low in sodium (salt) and frozen vegetables without added butter or sauces  Eat a variety of fresh fruits , canned fruit in 100% juice, frozen fruit, and dried fruit  Include whole grains  At least half of the grains you eat should be whole grains  Examples include whole-wheat bread, wheat pasta, brown rice, and whole-grain cereals such as oatmeal     Eat a variety of protein foods such as seafood (fish and shellfish), lean meat, and poultry without skin (turkey and chicken)  Examples of lean meats include pork leg, shoulder, or tenderloin, and beef round, sirloin, tenderloin, and extra lean ground beef  Other protein foods include eggs and egg substitutes, beans, peas, soy products, nuts, and seeds  Choose low-fat dairy products such as skim or 1% milk or low-fat yogurt, cheese, and cottage cheese  Limit unhealthy fats  such as butter, hard margarine, and shortening  Exercise:  Exercise at least 30 minutes per day on most days of the week  Some examples of exercise include walking, biking, dancing, and swimming  You can also fit in more physical activity by taking the stairs instead of the elevator or parking farther away from stores  Include muscle strengthening activities 2 days each week  Regular exercise provides many health benefits   It helps you manage your weight, and decreases your risk for type 2 diabetes, heart disease, stroke, and high blood pressure  Exercise can also help improve your mood  Ask your healthcare provider about the best exercise plan for you  General health and safety guidelines:   Do not smoke  Nicotine and other chemicals in cigarettes and cigars can cause lung damage  Ask your healthcare provider for information if you currently smoke and need help to quit  E-cigarettes or smokeless tobacco still contain nicotine  Talk to your healthcare provider before you use these products  Limit alcohol  A drink of alcohol is 12 ounces of beer, 5 ounces of wine, or 1½ ounces of liquor  Lose weight, if needed  Being overweight increases your risk of certain health conditions  These include heart disease, high blood pressure, type 2 diabetes, and certain types of cancer  Protect your skin  Do not sunbathe or use tanning beds  Use sunscreen with a SPF 15 or higher  Apply sunscreen at least 15 minutes before you go outside  Reapply sunscreen every 2 hours  Wear protective clothing, hats, and sunglasses when you are outside  Drive safely  Always wear your seatbelt  Make sure everyone in your car wears a seatbelt  A seatbelt can save your life if you are in an accident  Do not use your cell phone when you are driving  This could distract you and cause an accident  Pull over if you need to make a call or send a text message  Practice safe sex  Use latex condoms if are sexually active and have more than one partner  Your healthcare provider may recommend screening tests for sexually transmitted infections (STIs)  Wear helmets, lifejackets, and protective gear  Always wear a helmet when you ride a bike or motorcycle, go skiing, or play sports that could cause a head injury  Wear protective equipment when you play sports  Wear a lifejacket when you are on a boat or doing water sports      © Copyright [employer] 2022 Information is for End User's use only and may not be sold, redistributed or otherwise used for commercial purposes  All illustrations and images included in CareNotes® are the copyrighted property of [employer]  or Gwendolyn Castillo  The above information is an  only  It is not intended as medical advice for individual conditions or treatments  Talk to your doctor, nurse or pharmacist before following any medical regimen to see if it is safe and effective for you

## 2023-01-06 LAB — VZV IGG SER QL IA: NORMAL
